# Patient Record
Sex: FEMALE | Race: WHITE | NOT HISPANIC OR LATINO | Employment: FULL TIME | URBAN - METROPOLITAN AREA
[De-identification: names, ages, dates, MRNs, and addresses within clinical notes are randomized per-mention and may not be internally consistent; named-entity substitution may affect disease eponyms.]

---

## 2022-03-09 ENCOUNTER — HOSPITAL ENCOUNTER (OUTPATIENT)
Facility: HOSPITAL | Age: 27
Setting detail: OBSERVATION
Discharge: HOME | End: 2022-03-10
Attending: EMERGENCY MEDICINE | Admitting: EMERGENCY MEDICINE

## 2022-03-09 ENCOUNTER — APPOINTMENT (OUTPATIENT)
Dept: GENERAL RADIOLOGY | Facility: HOSPITAL | Age: 27
End: 2022-03-09
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
## 2022-03-09 DIAGNOSIS — M65.841 STENOSING TENOSYNOVITIS OF FINGER OF RIGHT HAND: Primary | ICD-10-CM

## 2022-03-09 PROBLEM — S61.451A CAT BITE OF HAND, RIGHT, INITIAL ENCOUNTER: Status: ACTIVE | Noted: 2022-03-09

## 2022-03-09 LAB
ALBUMIN SERPL-MCNC: 4.7 G/DL (ref 3.5–5.2)
ALBUMIN/GLOB SERPL: 1.5 G/DL
ALP SERPL-CCNC: 61 U/L (ref 39–117)
ALT SERPL W P-5'-P-CCNC: 26 U/L (ref 1–33)
ANION GAP SERPL CALCULATED.3IONS-SCNC: 10.1 MMOL/L (ref 5–15)
AST SERPL-CCNC: 20 U/L (ref 1–32)
BASOPHILS # BLD AUTO: 0.03 10*3/MM3 (ref 0–0.2)
BASOPHILS NFR BLD AUTO: 0.3 % (ref 0–1.5)
BILIRUB SERPL-MCNC: 0.8 MG/DL (ref 0–1.2)
BUN SERPL-MCNC: 13 MG/DL (ref 6–20)
BUN/CREAT SERPL: 21.3 (ref 7–25)
CALCIUM SPEC-SCNC: 9.8 MG/DL (ref 8.6–10.5)
CHLORIDE SERPL-SCNC: 104 MMOL/L (ref 98–107)
CO2 SERPL-SCNC: 21.9 MMOL/L (ref 22–29)
CREAT SERPL-MCNC: 0.61 MG/DL (ref 0.57–1)
D-LACTATE SERPL-SCNC: 0.6 MMOL/L (ref 0.5–2)
DEPRECATED RDW RBC AUTO: 40 FL (ref 37–54)
EGFRCR SERPLBLD CKD-EPI 2021: 127.4 ML/MIN/1.73
EOSINOPHIL # BLD AUTO: 0.04 10*3/MM3 (ref 0–0.4)
EOSINOPHIL NFR BLD AUTO: 0.4 % (ref 0.3–6.2)
ERYTHROCYTE [DISTWIDTH] IN BLOOD BY AUTOMATED COUNT: 13 % (ref 12.3–15.4)
GLOBULIN UR ELPH-MCNC: 3.2 GM/DL
GLUCOSE SERPL-MCNC: 98 MG/DL (ref 65–99)
HCG SERPL QL: NEGATIVE
HCT VFR BLD AUTO: 41.1 % (ref 34–46.6)
HGB BLD-MCNC: 13.7 G/DL (ref 12–15.9)
IMM GRANULOCYTES # BLD AUTO: 0.02 10*3/MM3 (ref 0–0.05)
IMM GRANULOCYTES NFR BLD AUTO: 0.2 % (ref 0–0.5)
LYMPHOCYTES # BLD AUTO: 1.28 10*3/MM3 (ref 0.7–3.1)
LYMPHOCYTES NFR BLD AUTO: 13.3 % (ref 19.6–45.3)
MCH RBC QN AUTO: 28.2 PG (ref 26.6–33)
MCHC RBC AUTO-ENTMCNC: 33.3 G/DL (ref 31.5–35.7)
MCV RBC AUTO: 84.6 FL (ref 79–97)
MONOCYTES # BLD AUTO: 0.94 10*3/MM3 (ref 0.1–0.9)
MONOCYTES NFR BLD AUTO: 9.8 % (ref 5–12)
NEUTROPHILS NFR BLD AUTO: 7.29 10*3/MM3 (ref 1.7–7)
NEUTROPHILS NFR BLD AUTO: 76 % (ref 42.7–76)
NRBC BLD AUTO-RTO: 0 /100 WBC (ref 0–0.2)
PLATELET # BLD AUTO: 227 10*3/MM3 (ref 140–450)
PMV BLD AUTO: 9.2 FL (ref 6–12)
POTASSIUM SERPL-SCNC: 3.8 MMOL/L (ref 3.5–5.2)
PROT SERPL-MCNC: 7.9 G/DL (ref 6–8.5)
RBC # BLD AUTO: 4.86 10*6/MM3 (ref 3.77–5.28)
SARS-COV-2 RNA PNL SPEC NAA+PROBE: NOT DETECTED
SODIUM SERPL-SCNC: 136 MMOL/L (ref 136–145)
WBC NRBC COR # BLD: 9.6 10*3/MM3 (ref 3.4–10.8)

## 2022-03-09 PROCEDURE — 87040 BLOOD CULTURE FOR BACTERIA: CPT | Performed by: EMERGENCY MEDICINE

## 2022-03-09 PROCEDURE — 85025 COMPLETE CBC W/AUTO DIFF WBC: CPT | Performed by: PHYSICIAN ASSISTANT

## 2022-03-09 PROCEDURE — 25010000002 PIPERACILLIN SOD-TAZOBACTAM PER 1 G: Performed by: STUDENT IN AN ORGANIZED HEALTH CARE EDUCATION/TRAINING PROGRAM

## 2022-03-09 PROCEDURE — 87635 SARS-COV-2 COVID-19 AMP PRB: CPT | Performed by: PHYSICIAN ASSISTANT

## 2022-03-09 PROCEDURE — G0378 HOSPITAL OBSERVATION PER HR: HCPCS

## 2022-03-09 PROCEDURE — 80053 COMPREHEN METABOLIC PANEL: CPT | Performed by: PHYSICIAN ASSISTANT

## 2022-03-09 PROCEDURE — 84703 CHORIONIC GONADOTROPIN ASSAY: CPT | Performed by: PHYSICIAN ASSISTANT

## 2022-03-09 PROCEDURE — 99284 EMERGENCY DEPT VISIT MOD MDM: CPT

## 2022-03-09 PROCEDURE — 63710000001 ONDANSETRON ODT 4 MG TABLET DISPERSIBLE: Performed by: EMERGENCY MEDICINE

## 2022-03-09 PROCEDURE — 25010000002 PIPERACILLIN SOD-TAZOBACTAM PER 1 G: Performed by: PHYSICIAN ASSISTANT

## 2022-03-09 PROCEDURE — 73140 X-RAY EXAM OF FINGER(S): CPT

## 2022-03-09 PROCEDURE — 25010000002 ONDANSETRON PER 1 MG: Performed by: NURSE PRACTITIONER

## 2022-03-09 PROCEDURE — 87040 BLOOD CULTURE FOR BACTERIA: CPT | Performed by: PHYSICIAN ASSISTANT

## 2022-03-09 PROCEDURE — C9803 HOPD COVID-19 SPEC COLLECT: HCPCS

## 2022-03-09 PROCEDURE — 96365 THER/PROPH/DIAG IV INF INIT: CPT

## 2022-03-09 PROCEDURE — 83605 ASSAY OF LACTIC ACID: CPT | Performed by: STUDENT IN AN ORGANIZED HEALTH CARE EDUCATION/TRAINING PROGRAM

## 2022-03-09 PROCEDURE — 96375 TX/PRO/DX INJ NEW DRUG ADDON: CPT

## 2022-03-09 RX ORDER — ONDANSETRON 4 MG/1
4 TABLET, ORALLY DISINTEGRATING ORAL ONCE
Status: COMPLETED | OUTPATIENT
Start: 2022-03-09 | End: 2022-03-09

## 2022-03-09 RX ORDER — HYDROCODONE BITARTRATE AND ACETAMINOPHEN 7.5; 325 MG/1; MG/1
1 TABLET ORAL ONCE
Status: COMPLETED | OUTPATIENT
Start: 2022-03-09 | End: 2022-03-09

## 2022-03-09 RX ORDER — SODIUM CHLORIDE 0.9 % (FLUSH) 0.9 %
10 SYRINGE (ML) INJECTION AS NEEDED
Status: DISCONTINUED | OUTPATIENT
Start: 2022-03-09 | End: 2022-03-10 | Stop reason: HOSPADM

## 2022-03-09 RX ORDER — CHOLECALCIFEROL (VITAMIN D3) 125 MCG
5 CAPSULE ORAL NIGHTLY PRN
Status: DISCONTINUED | OUTPATIENT
Start: 2022-03-09 | End: 2022-03-10 | Stop reason: HOSPADM

## 2022-03-09 RX ORDER — SODIUM CHLORIDE 0.9 % (FLUSH) 0.9 %
10 SYRINGE (ML) INJECTION EVERY 12 HOURS SCHEDULED
Status: DISCONTINUED | OUTPATIENT
Start: 2022-03-09 | End: 2022-03-10 | Stop reason: HOSPADM

## 2022-03-09 RX ORDER — HYDROCODONE BITARTRATE AND ACETAMINOPHEN 7.5; 325 MG/1; MG/1
1 TABLET ORAL EVERY 6 HOURS PRN
Status: DISCONTINUED | OUTPATIENT
Start: 2022-03-09 | End: 2022-03-10 | Stop reason: HOSPADM

## 2022-03-09 RX ORDER — ONDANSETRON 2 MG/ML
4 INJECTION INTRAMUSCULAR; INTRAVENOUS EVERY 6 HOURS PRN
Status: DISCONTINUED | OUTPATIENT
Start: 2022-03-09 | End: 2022-03-10 | Stop reason: HOSPADM

## 2022-03-09 RX ORDER — ACETAMINOPHEN 325 MG/1
650 TABLET ORAL EVERY 4 HOURS PRN
Status: DISCONTINUED | OUTPATIENT
Start: 2022-03-09 | End: 2022-03-10 | Stop reason: HOSPADM

## 2022-03-09 RX ADMIN — ONDANSETRON 4 MG: 4 TABLET, ORALLY DISINTEGRATING ORAL at 12:38

## 2022-03-09 RX ADMIN — TAZOBACTAM SODIUM AND PIPERACILLIN SODIUM 3.38 G: 375; 3 INJECTION, SOLUTION INTRAVENOUS at 19:28

## 2022-03-09 RX ADMIN — Medication 10 ML: at 20:10

## 2022-03-09 RX ADMIN — TAZOBACTAM SODIUM AND PIPERACILLIN SODIUM 3.38 G: 375; 3 INJECTION, SOLUTION INTRAVENOUS at 11:07

## 2022-03-09 RX ADMIN — HYDROCODONE BITARTRATE AND ACETAMINOPHEN 1 TABLET: 7.5; 325 TABLET ORAL at 17:26

## 2022-03-09 RX ADMIN — ONDANSETRON 4 MG: 2 INJECTION INTRAMUSCULAR; INTRAVENOUS at 20:08

## 2022-03-09 RX ADMIN — HYDROCODONE BITARTRATE AND ACETAMINOPHEN 1 TABLET: 7.5; 325 TABLET ORAL at 12:38

## 2022-03-09 NOTE — PLAN OF CARE
Goal Outcome Evaluation:  Plan of Care Reviewed With: patient   Pt to obs for infected cat bite to the rt index finger. Pt had first dose of zosyn in the ER IV. Pt awaiting to see hand. PT alert and orient times 4, NAD, RSR on the monitor. Up ad pasquale. Will continue to monitor.      Progress: no change

## 2022-03-09 NOTE — ED TRIAGE NOTES
Pt arrives from [facility]. 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 Bite is to her right pointer finger. Patient masked at arrival and triage staff wore all appropriate PPE during entire encounter with patient.

## 2022-03-09 NOTE — CASE MANAGEMENT/SOCIAL WORK
Discharge Planning Assessment  [facility]     Patient Name: Nadia Jerome  MRN: [id number]  Today's Date: 3/9/2022    Admit Date: 3/9/2022     Discharge Needs Assessment     Row Name 03/09/22 1235       Living Environment    People in Home alone    Current Living Arrangements apartment    Primary Care Provided by self    Provides Primary Care For no one    Family Caregiver if Needed parent(s)    Quality of Family Relationships helpful;involved;supportive    Able to Return to Prior Arrangements yes       Resource/Environmental Concerns    Resource/Environmental Concerns none    Transportation Concerns no car       Transition Planning    Patient/Family Anticipates Transition to home    Patient/Family Anticipated Services at Transition none    Transportation Anticipated car, drives self       Discharge Needs Assessment    Readmission Within the Last 30 Days no previous admission in last 30 days    Equipment Currently Used at Home none    Concerns to be Addressed no discharge needs identified;denies needs/concerns at this time    Anticipated Changes Related to Illness none    Equipment Needed After Discharge none               Discharge Plan     Row Name 03/09/22 1237       Plan    Plan Patient plans to return home upon discharge, where she lives alone in an apartment with no environmental concerns. She is IND with IADL's at baseline and is  employed at a veterinary office. She drives and reports no transportation issues. She can drive herself home from the hospital. No DC needs identified. Encouraged patient to contact CCP should her DC needs change.    Patient/Family in Agreement with Plan yes    Plan Comments Home upon discharge. No DC needs. Can arrange own transportation.              Continued Care and Services - Admitted Since 3/9/2022    Coordination has not been started for this encounter.          Demographic Summary     Row Name 03/09/22 1231       General Information    Admission Type observation     Arrived From urgent care    Required Notices Provided Observation Status Notice    Expected Length of Stay (LOS) Less than 24 hours. Admitted to ED Observation Unit.    Referral Source admission list;emergency department    Reason for Consult discharge planning    Preferred Language English    General Information Comments Facesheet verified. Role of CCP explained. Appropriate PPE worn at all times.       Contact Information    Permission Granted to Share Info With family/designee    Contact Information Obtained for other (see comments)    Contact Information Comments Verified emergency contact information for family.               Functional Status     Row Name 03/09/22 1232       Functional Status    Usual Activity Tolerance excellent    Current Activity Tolerance excellent       Functional Status, IADL    Medications independent    Meal Preparation independent    Housekeeping independent    Laundry independent    Shopping independent    IADL Comments IND with IADL's at baseline.       Mental Status    General Appearance WDL WDL       Mental Status Summary    Recent Changes in Mental Status/Cognitive Functioning no changes       Employment/    Employment Status employed full-time    Shift Worked first shift    Current or Previous Occupation other (see comments)  Veterinary services.               Psychosocial     Row Name 03/09/22 1233       Values/Beliefs    Spiritual, Cultural Beliefs, Zoroastrianism Practices, Values that Affect Care no       Behavior WDL    Behavior WDL WDL       Emotion Mood WDL    Emotion/Mood/Affect WDL WDL       Speech WDL    Speech WDL WDL       Perceptual State WDL    Perceptual State WDL WDL       Thought Process WDL    Thought Process WDL WDL       Intellectual Performance WDL    Intellectual Performance WDL WDL       Coping/Stress    Major Change/Loss/Stressor none    Patient Personal Strengths able to adapt;expressive of emotions;expressive of needs;strong support system    Sources  of Support parent(s)    Techniques to Shiner with Loss/Stress/Change not applicable    Reaction to Health Status accepting;adjusting    Understanding of Condition and Treatment partial understanding of treatment;partial understanding of medical condition;needs additional information       Developmental Stage (Eriksson's)    Developmental Stage Stage 6 (18-35 years/Young Adulthood) Intimacy vs. Isolation       C-SSRS (Recent)    Q1 Wished to be Dead (Past Month) no    Q2 Suicidal Thoughts (Past Month) no    Q6 Suicide Behavior (Lifetime) no       Violence Risk    Feels Like Hurting Others no    Previous Attempt to Harm Others no               Abuse/Neglect     Row Name 03/09/22 1234       Personal Safety    Feels Unsafe at Home or Work/School no    Feels Threatened by Someone no    Does Anyone Try to Keep You From Having Contact with Others or Doing Things Outside Your Home? no    Physical Signs of Abuse Present no               Legal     Row Name 03/09/22 1234       Financial/Legal    Source of Income salary/wages               Substance Abuse    No documentation.                Patient Forms    No documentation.                   Eric Santiago RN

## 2022-03-09 NOTE — H&P
[facility]   HISTORY AND PHYSICAL    Patient Name: Nadia Jerome  : 1995  MRN: [id number]  Primary Care Physician:  Provider, No Known  Date of admission: 3/9/2022    Subjective   Subjective     Chief Complaint:   Chief Complaint   Patient presents with   • Animal Bite              HPI:    Nadia Jerome is a 26 y.o. female with no past medical history who presents to the ER with right hand pain after a cat bite yesterday.  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  She states that she was seen at an urgent care this morning where she was given a tetanus booster and was directed to the ER.  Patient reports pain and numbness and tingling of the right second digit that extends down past her knuckle into the palm.  Patient is states that she is unable to bend the finger secondary to swelling and pain.  Patient denies any fevers or chills.  Denies nausea, vomiting, and abdominal pain.  Denies urinary difficulties.  Denies chest pain or shortness of breath.    ER evaluation significant for x-ray of the right finger revealing no soft tissue gas or radiopaque foreign body with diffuse soft tissue swelling.  Hand surgery was contacted by the ER provider and agreed with overnight admission for IV antibiotics and observation.  Blood cultures were drawn and are pending at this time.  Lab work is essentially unremarkable with a normal WBC of 9.6 and lactate 0.6.    Review of Systems   All systems were reviewed and negative except for: What is mentioned above in the HPI.    Personal History     No past medical history on file.    No past surgical history on file.    Family History: family history is not on file. Otherwise pertinent FHx was reviewed and not pertinent to current issue.    Social History:  reports that she has been smoking cigarettes. She has never used smokeless tobacco. She reports current alcohol use. Drug use questions deferred to the  physician.    Home Medications:  Cholecalciferol    Allergies:  No Known Allergies    Objective   Objective     Vitals:   Temp:  [97.9 °F (36.6 °C)-99.6 °F (37.6 °C)] 97.9 °F (36.6 °C)  Heart Rate:  [] 97  Resp:  [16-18] 18  BP: (117-126)/(79-80) 117/80  Physical Exam    Constitutional: 26-year-old female, well-nourished, in no acute distress on room air   Eyes: PERRLA, sclerae anicteric, no conjunctival injection   HENT: NCAT, mucous membranes moist   Neck: Supple, no thyromegaly, no lymphadenopathy, trachea midline   Respiratory: Clear to auscultation bilaterally, nonlabored respirations    Cardiovascular: RRR, no murmurs, rubs, or gallops, palpable pedal pulses bilaterally   Gastrointestinal: Positive bowel sounds, soft, nontender, nondistended   Musculoskeletal: Right pointer finger is noted with a puncture wound on the lateral aspect of the proximal phalange.  There is mild erythema and edema noted circumferentially of the pointer finger that extends distally to the palmar and dorsal aspects of the hand but not to the thenar eminence.  There is limited range of motion of the pointer finger.  No bilateral ankle edema, no clubbing or cyanosis to extremities   Psychiatric: Appropriate affect, cooperative   Neurologic: Oriented x 3, strength symmetric in all extremities, Cranial Nerves grossly intact to confrontation, speech clear   Skin: No rashes     Result Review    Result Review:  I have personally reviewed the results from the time of this admission to 3/9/2022 12:36 EST and agree with these findings:  []  Laboratory  []  Microbiology  []  Radiology  []  EKG/Telemetry   []  Cardiology/Vascular   []  Pathology  []  Old records  []  Other:  Most notable findings include:   XR Finger 2+ View Right    Result Date: 3/9/2022  XR FINGER 2+ VW RIGHT-clinical: Cat bite  FINDINGS: No soft tissue gas or radiopaque foreign body, diffuse soft tissue swelling. No acute osseous or articular abnormality seen. The  remainder is unremarkable.  This report was finalized on 3/9/2022 11:30 AM by Dr. Logan Maya M.D.        Assessment/Plan   Assessment / Plan     Brief Patient Summary:  Nadia Jerome is a 26 y.o. female who is being admitted to the observation unit for further evaluation of a cat bite of the right hand with plan for hand surgery consultation and IV antibiotics.    Active Hospital Problems:  Active Hospital Problems    Diagnosis    • Cat bite of hand, right, initial encounter      Plan:     Cat bite right hand, pointer finger  -X-ray shows diffuse soft tissue swelling with no gas or radiopaque foreign body.  -Patient received Tdap vaccination at an urgent care this morning.  -Continue IV Zosyn every 8  -Dr. Andrea Felix hand surgery has been consulted  -Pain management       DVT prophylaxis:  Mechanical DVT prophylaxis orders are present.    CODE STATUS:    Code Status (Patient has no pulse and is not breathing): CPR (Attempt to Resuscitate)  Medical Interventions (Patient has pulse or is breathing): Full Support    Admission Status:  I believe this patient meets observation status.    I wore an face mask, eye protection, and gloves during this patient encounter. Patient also wearing a surgical mask. Hand hygeine performed before and after seeing the patient.    Electronically signed by Sherly Benz PA-C, 03/09/22, 12:36 PM EST.

## 2022-03-09 NOTE — ED PROVIDER NOTES
EMERGENCY DEPARTMENT ENCOUNTER    Room Number:  115/1  Date of encounter:  3/9/2022  PCP: Provider, No Known  Historian: Patient      I used full protective equipment while examining this patient.  This includes face mask, gloves and protective eyewear.  I washed my hands before entering the room and immediately upon leaving the room      HPI:  Chief Complaint: Cat bite  A complete HPI/ROS/PMH/PSH/SH/FH are unobtainable due to: Nothing    Context: Nadia Jerome is a 26 y.o. female who presents to the ED c/o 2 day history of cat bite to right index finger.  Patient works in a 's office.  She states they were restraining a cat yesterday for blood draw and it bit her on her right index finger over the PIP joint.  She is right-handed.  She denies any fevers, chills.  She states the swelling and pain has become progressively worse.  She describes the pain as constant, sharp, and worse with movement.  She denies any numbness or tingling distal to the finger.  She was initially seen at immediate care center prior to arrival.  She was sent here for further evaluation.  They did give her a tetanus shot there.    Review of Medical Records  I reviewed patient's immediate care office visit from prior to arrival.  Patient was sent here for further evaluation.    PAST MEDICAL HISTORY  Active Ambulatory Problems     Diagnosis Date Noted   • No Active Ambulatory Problems     Resolved Ambulatory Problems     Diagnosis Date Noted   • No Resolved Ambulatory Problems     No Additional Past Medical History         PAST SURGICAL HISTORY  History reviewed. No pertinent surgical history.      FAMILY HISTORY  History reviewed. No pertinent family history.      SOCIAL HISTORY  Social History     Socioeconomic History   • Marital status: Single   Tobacco Use   • Smoking status: Current Every Day Smoker     Types: Cigarettes   • Smokeless tobacco: Never Used   Vaping Use   • Vaping Use: Never used   Substance and Sexual Activity    • Alcohol use: Yes     Comment: occ   • Drug use: Defer   • Sexual activity: Defer         ALLERGIES  Patient has no known allergies.        REVIEW OF SYSTEMS  All systems reviewed and negative except for those discussed in HPI.       PHYSICAL EXAM    I have reviewed the triage vital signs and nursing notes.    ED Triage Vitals [03/09/22 0959]   Temp Heart Rate Resp BP SpO2   97.9 °F (36.6 °C) 108 18 -- 99 %      Temp src Heart Rate Source Patient Position BP Location FiO2 (%)   Tympanic Monitor -- -- --       Physical Exam  GENERAL: Alert, oriented, not distressed  HENT: head atraumatic, no nuchal rigidity  EYES: no scleral icterus, EOMI  CV: regular rhythm, regular rate, no murmur  RESPIRATORY: normal effort, CTA  ABDOMEN: soft, nontender  MUSCULOSKELETAL: Multiple puncture wounds over the anterior surface of the PIP joint of the index finger.  Limited range of motion secondary to pain.  There is erythema and swelling extending proximally to the MCP and into the hand.  She is neurovascularly intact distally.  NEURO: alert, moves all extremities, follows commands  SKIN: warm, dry        LAB RESULTS  Recent Results (from the past 24 hour(s))   Comprehensive Metabolic Panel    Collection Time: 03/09/22 10:42 AM    Specimen: Blood   Result Value Ref Range    Glucose 98 65 - 99 mg/dL    BUN 13 6 - 20 mg/dL    Creatinine 0.61 0.57 - 1.00 mg/dL    Sodium 136 136 - 145 mmol/L    Potassium 3.8 3.5 - 5.2 mmol/L    Chloride 104 98 - 107 mmol/L    CO2 21.9 (L) 22.0 - 29.0 mmol/L    Calcium 9.8 8.6 - 10.5 mg/dL    Total Protein 7.9 6.0 - 8.5 g/dL    Albumin 4.70 3.50 - 5.20 g/dL    ALT (SGPT) 26 1 - 33 U/L    AST (SGOT) 20 1 - 32 U/L    Alkaline Phosphatase 61 39 - 117 U/L    Total Bilirubin 0.8 0.0 - 1.2 mg/dL    Globulin 3.2 gm/dL    A/G Ratio 1.5 g/dL    BUN/Creatinine Ratio 21.3 7.0 - 25.0    Anion Gap 10.1 5.0 - 15.0 mmol/L    eGFR 127.4 >60.0 mL/min/1.73   hCG, Serum, Qualitative    Collection Time: 03/09/22 10:42 AM     Specimen: Blood   Result Value Ref Range    HCG Qualitative Negative Negative   CBC Auto Differential    Collection Time: 03/09/22 10:42 AM    Specimen: Blood   Result Value Ref Range    WBC 9.60 3.40 - 10.80 10*3/mm3    RBC 4.86 3.77 - 5.28 10*6/mm3    Hemoglobin 13.7 12.0 - 15.9 g/dL    Hematocrit 41.1 34.0 - 46.6 %    MCV 84.6 79.0 - 97.0 fL    MCH 28.2 26.6 - 33.0 pg    MCHC 33.3 31.5 - 35.7 g/dL    RDW 13.0 12.3 - 15.4 %    RDW-SD 40.0 37.0 - 54.0 fl    MPV 9.2 6.0 - 12.0 fL    Platelets 227 140 - 450 10*3/mm3    Neutrophil % 76.0 42.7 - 76.0 %    Lymphocyte % 13.3 (L) 19.6 - 45.3 %    Monocyte % 9.8 5.0 - 12.0 %    Eosinophil % 0.4 0.3 - 6.2 %    Basophil % 0.3 0.0 - 1.5 %    Immature Grans % 0.2 0.0 - 0.5 %    Neutrophils, Absolute 7.29 (H) 1.70 - 7.00 10*3/mm3    Lymphocytes, Absolute 1.28 0.70 - 3.10 10*3/mm3    Monocytes, Absolute 0.94 (H) 0.10 - 0.90 10*3/mm3    Eosinophils, Absolute 0.04 0.00 - 0.40 10*3/mm3    Basophils, Absolute 0.03 0.00 - 0.20 10*3/mm3    Immature Grans, Absolute 0.02 0.00 - 0.05 10*3/mm3    nRBC 0.0 0.0 - 0.2 /100 WBC   COVID-19,[facility] IN-HOUSE CEPHEID/YIFAN NP SWAB IN TRANSPORT MEDIA 8-12 HR TAT - Swab, Nasopharynx    Collection Time: 03/09/22 12:25 PM    Specimen: Nasopharynx; Swab   Result Value Ref Range    COVID19 Not Detected Not Detected - Ref. Range   Lactic Acid, Plasma    Collection Time: 03/09/22 12:57 PM    Specimen: Blood   Result Value Ref Range    Lactate 0.6 0.5 - 2.0 mmol/L       Ordered the above labs and independently reviewed the results.        RADIOLOGY  XR Finger 2+ View Right    Result Date: 3/9/2022  XR FINGER 2+ VW RIGHT-clinical: Cat bite  FINDINGS: No soft tissue gas or radiopaque foreign body, diffuse soft tissue swelling. No acute osseous or articular abnormality seen. The remainder is unremarkable.  This report was finalized on 3/9/2022 11:30 AM by Dr. Logan Maya M.D.        I ordered the above noted radiological studies. Reviewed by me and  discussed with radiologist.  See dictation for official radiology interpretation.      MEDICATIONS GIVEN IN ER    Medications   sodium chloride 0.9 % flush 10 mL (has no administration in time range)   sodium chloride 0.9 % flush 10 mL (has no administration in time range)   sodium chloride 0.9 % flush 10 mL (has no administration in time range)   acetaminophen (TYLENOL) tablet 650 mg (has no administration in time range)   melatonin tablet 5 mg (has no administration in time range)   piperacillin-tazobactam (ZOSYN) 3.375 g in iso-osmotic dextrose 50 ml (premix) (has no administration in time range)   HYDROcodone-acetaminophen (NORCO) 7.5-325 MG per tablet 1 tablet (has no administration in time range)   piperacillin-tazobactam (ZOSYN) 3.375 g in iso-osmotic dextrose 50 ml (premix) (0 g Intravenous Stopped 3/9/22 1140)   HYDROcodone-acetaminophen (NORCO) 7.5-325 MG per tablet 1 tablet (1 tablet Oral Given 3/9/22 1238)   ondansetron ODT (ZOFRAN-ODT) disintegrating tablet 4 mg (4 mg Oral Given 3/9/22 1238)         PROGRESS, DATA ANALYSIS, CONSULTS, AND MEDICAL DECISION MAKING    All labs have been independently reviewed by me.  All radiology studies have been reviewed by me and discussed with radiologist dictating the report.   EKG's independently viewed and interpreted by me.  Discussion below represents my analysis of pertinent findings related to patient's condition, differential diagnosis, treatment plan and final disposition.    I have discussed case with Dr. Lee, emergency room physician.  He has performed his own bedside examination and agrees with treatment plan.    ED Course as of 03/09/22 1521   Wed Mar 09, 2022   1009 Patient presents 1 day after cat bite to right index finger.  Concerning for early tenosynovitis.  Tetanus shot up-to-date.  Neurovascular intact distally.  Plan for x-rays to evaluate for foreign body, IV antibiotics, reevaluation. [EE]   1118 WBC: 9.60 [EE]   1118 Hemoglobin: 13.7 [EE]    1118 HCG Qualitative: Negative [EE]   1118 Right index finger films interpreted myself show no acute foreign body or fracture. [EE]   1140 Plan to admit the patient for further IV antibiotics and hand consultation.  I will consult Dr. Felix [EE]   1212 I discussed case with Ana, physician assistant in the observation unit.  She agrees to admit. [EE]      ED Course User Index  [EE] Rj Banda PA       AS OF 15:21 EST VITALS:    BP - 130/81  HR - 94  TEMP - 97.9 °F (36.6 °C) (Tympanic)  O2 SATS - 99%        DIAGNOSIS  Final diagnoses:   Stenosing tenosynovitis of finger of right hand   Cat bite, initial encounter         DISPOSITION  Admitted      Dictated utilizing Dragon dictation     Rj Banda PA  03/09/22 9467

## 2022-03-09 NOTE — ED PROVIDER NOTES
MD ATTESTATION NOTE    The JULIO C and I have discussed this patient's history, physical exam, and treatment plan.  I have reviewed the documentation and personally had a face to face interaction with the patient. I affirm the documentation and agree with the treatment and plan.  The attached note describes my personal findings.    I provided a substantive portion of the care of this patient. I personally performed the physical exam, in its entirety.    Nadia Jerome is a 26 y.o. female who presents to the ED c/o being bitten by a cat yesterday.  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  She reports she was bitten by cat yesterday morning.  She went to [facility] urgent care today and was sent here for further evaluation.  She received a tetanus shot at [facility].  She has not had any treatment for her symptoms.  She reports pain and swelling on her right index finger.  She denies other injury.      On exam:  GENERAL: Awake, alert, no acute distress  SKIN: Warm, dry  HENT: Normocephalic, atraumatic  EYES: no scleral icterus  CV: regular rhythm, regular rate  RESPIRATORY: normal effort, lungs clear  ABDOMEN: soft, nontender, nondistended  MUSCULOSKELETAL: Right index finger with single open wound.  Hemostatic.  Moderate swelling and erythema to the right second digit.  NEURO: alert, moves all extremities, follows commands    Labs  Recent Results (from the past 24 hour(s))   Comprehensive Metabolic Panel    Collection Time: 03/09/22 10:42 AM    Specimen: Blood   Result Value Ref Range    Glucose 98 65 - 99 mg/dL    BUN 13 6 - 20 mg/dL    Creatinine 0.61 0.57 - 1.00 mg/dL    Sodium 136 136 - 145 mmol/L    Potassium 3.8 3.5 - 5.2 mmol/L    Chloride 104 98 - 107 mmol/L    CO2 21.9 (L) 22.0 - 29.0 mmol/L    Calcium 9.8 8.6 - 10.5 mg/dL    Total Protein 7.9 6.0 - 8.5 g/dL    Albumin 4.70 3.50 - 5.20 g/dL    ALT (SGPT) 26 1 - 33 U/L    AST (SGOT) 20 1 - 32 U/L    Alkaline Phosphatase 61 39 - 117 U/L    Total  Bilirubin 0.8 0.0 - 1.2 mg/dL    Globulin 3.2 gm/dL    A/G Ratio 1.5 g/dL    BUN/Creatinine Ratio 21.3 7.0 - 25.0    Anion Gap 10.1 5.0 - 15.0 mmol/L    eGFR 127.4 >60.0 mL/min/1.73   hCG, Serum, Qualitative    Collection Time: 03/09/22 10:42 AM    Specimen: Blood   Result Value Ref Range    HCG Qualitative Negative Negative   CBC Auto Differential    Collection Time: 03/09/22 10:42 AM    Specimen: Blood   Result Value Ref Range    WBC 9.60 3.40 - 10.80 10*3/mm3    RBC 4.86 3.77 - 5.28 10*6/mm3    Hemoglobin 13.7 12.0 - 15.9 g/dL    Hematocrit 41.1 34.0 - 46.6 %    MCV 84.6 79.0 - 97.0 fL    MCH 28.2 26.6 - 33.0 pg    MCHC 33.3 31.5 - 35.7 g/dL    RDW 13.0 12.3 - 15.4 %    RDW-SD 40.0 37.0 - 54.0 fl    MPV 9.2 6.0 - 12.0 fL    Platelets 227 140 - 450 10*3/mm3    Neutrophil % 76.0 42.7 - 76.0 %    Lymphocyte % 13.3 (L) 19.6 - 45.3 %    Monocyte % 9.8 5.0 - 12.0 %    Eosinophil % 0.4 0.3 - 6.2 %    Basophil % 0.3 0.0 - 1.5 %    Immature Grans % 0.2 0.0 - 0.5 %    Neutrophils, Absolute 7.29 (H) 1.70 - 7.00 10*3/mm3    Lymphocytes, Absolute 1.28 0.70 - 3.10 10*3/mm3    Monocytes, Absolute 0.94 (H) 0.10 - 0.90 10*3/mm3    Eosinophils, Absolute 0.04 0.00 - 0.40 10*3/mm3    Basophils, Absolute 0.03 0.00 - 0.20 10*3/mm3    Immature Grans, Absolute 0.02 0.00 - 0.05 10*3/mm3    nRBC 0.0 0.0 - 0.2 /100 WBC   COVID-19,[facility] IN-HOUSE CEPHEID/YIFAN NP SWAB IN TRANSPORT MEDIA 8-12 HR TAT - Swab, Nasopharynx    Collection Time: 03/09/22 12:25 PM    Specimen: Nasopharynx; Swab   Result Value Ref Range    COVID19 Not Detected Not Detected - Ref. Range   Lactic Acid, Plasma    Collection Time: 03/09/22 12:57 PM    Specimen: Blood   Result Value Ref Range    Lactate 0.6 0.5 - 2.0 mmol/L       Radiology  XR Finger 2+ View Right    Result Date: 3/9/2022  XR FINGER 2+ VW RIGHT-clinical: Cat bite  FINDINGS: No soft tissue gas or radiopaque foreign body, diffuse soft tissue swelling. No acute osseous or articular abnormality seen. The  remainder is unremarkable.  This report was finalized on 3/9/2022 11:30 AM by Dr. Logan Maya M.D.        Medical Decision Making:  ED Course as of 03/09/22 1510   Wed Mar 09, 2022   1009 Patient presents 1 day after cat bite to right index finger.  Concerning for early tenosynovitis.  Tetanus shot up-to-date.  Neurovascular intact distally.  Plan for x-rays to evaluate for foreign body, IV antibiotics, reevaluation. [EE]   1118 WBC: 9.60 [EE]   1118 Hemoglobin: 13.7 [EE]   1118 HCG Qualitative: Negative [EE]   1118 Right index finger films interpreted myself show no acute foreign body or fracture. [EE]   1140 Plan to admit the patient for further IV antibiotics and hand consultation.  I will consult Dr. Felix [EE]   1212 I discussed case with Ana, physician assistant in the observation unit.  She agrees to admit. [EE]      ED Course User Index  [EE] Rj Banda, PA       Plan x-ray to rule out foreign body.  Plan IV antibiotics.  Plan blood cultures and laboratory evaluation.    PPE: The patient wore a mask and I wore an N95 mask throughout the entire patient encounter.      The patient has a COVID HM Topic on their chart, and they are fully vaccinated.    Diagnosis  Final diagnoses:   Stenosing tenosynovitis of finger of right hand   Cat bite, initial encounter        Jose Juan Lee MD  03/09/22 1510

## 2022-03-10 VITALS
TEMPERATURE: 98.2 F | OXYGEN SATURATION: 99 % | WEIGHT: 110 LBS | DIASTOLIC BLOOD PRESSURE: 77 MMHG | SYSTOLIC BLOOD PRESSURE: 118 MMHG | BODY MASS INDEX: 19.49 KG/M2 | HEART RATE: 76 BPM | RESPIRATION RATE: 17 BRPM | HEIGHT: 63 IN

## 2022-03-10 LAB
ANION GAP SERPL CALCULATED.3IONS-SCNC: 8 MMOL/L (ref 5–15)
BUN SERPL-MCNC: 11 MG/DL (ref 6–20)
BUN/CREAT SERPL: 13.9 (ref 7–25)
CALCIUM SPEC-SCNC: 8.6 MG/DL (ref 8.6–10.5)
CHLORIDE SERPL-SCNC: 107 MMOL/L (ref 98–107)
CO2 SERPL-SCNC: 22 MMOL/L (ref 22–29)
CREAT SERPL-MCNC: 0.79 MG/DL (ref 0.57–1)
DEPRECATED RDW RBC AUTO: 38.7 FL (ref 37–54)
EGFRCR SERPLBLD CKD-EPI 2021: 106.6 ML/MIN/1.73
ERYTHROCYTE [DISTWIDTH] IN BLOOD BY AUTOMATED COUNT: 12.9 % (ref 12.3–15.4)
GLUCOSE SERPL-MCNC: 96 MG/DL (ref 65–99)
HCT VFR BLD AUTO: 34.7 % (ref 34–46.6)
HGB BLD-MCNC: 12 G/DL (ref 12–15.9)
MCH RBC QN AUTO: 28.7 PG (ref 26.6–33)
MCHC RBC AUTO-ENTMCNC: 34.6 G/DL (ref 31.5–35.7)
MCV RBC AUTO: 83 FL (ref 79–97)
PLATELET # BLD AUTO: 193 10*3/MM3 (ref 140–450)
PMV BLD AUTO: 9.4 FL (ref 6–12)
POTASSIUM SERPL-SCNC: 4.2 MMOL/L (ref 3.5–5.2)
RBC # BLD AUTO: 4.18 10*6/MM3 (ref 3.77–5.28)
SODIUM SERPL-SCNC: 137 MMOL/L (ref 136–145)
WBC NRBC COR # BLD: 7.26 10*3/MM3 (ref 3.4–10.8)

## 2022-03-10 PROCEDURE — 80048 BASIC METABOLIC PNL TOTAL CA: CPT | Performed by: STUDENT IN AN ORGANIZED HEALTH CARE EDUCATION/TRAINING PROGRAM

## 2022-03-10 PROCEDURE — G0378 HOSPITAL OBSERVATION PER HR: HCPCS

## 2022-03-10 PROCEDURE — 25010000002 PIPERACILLIN SOD-TAZOBACTAM PER 1 G: Performed by: STUDENT IN AN ORGANIZED HEALTH CARE EDUCATION/TRAINING PROGRAM

## 2022-03-10 PROCEDURE — 85027 COMPLETE CBC AUTOMATED: CPT | Performed by: STUDENT IN AN ORGANIZED HEALTH CARE EDUCATION/TRAINING PROGRAM

## 2022-03-10 PROCEDURE — 25010000002 ONDANSETRON PER 1 MG: Performed by: NURSE PRACTITIONER

## 2022-03-10 PROCEDURE — 96366 THER/PROPH/DIAG IV INF ADDON: CPT

## 2022-03-10 PROCEDURE — 96376 TX/PRO/DX INJ SAME DRUG ADON: CPT

## 2022-03-10 RX ORDER — AMOXICILLIN AND CLAVULANATE POTASSIUM 875; 125 MG/1; MG/1
1 TABLET, FILM COATED ORAL 2 TIMES DAILY
Qty: 20 TABLET | Refills: 0 | OUTPATIENT
Start: 2022-03-10 | End: 2022-07-21

## 2022-03-10 RX ADMIN — TAZOBACTAM SODIUM AND PIPERACILLIN SODIUM 3.38 G: 375; 3 INJECTION, SOLUTION INTRAVENOUS at 03:55

## 2022-03-10 RX ADMIN — ONDANSETRON 4 MG: 2 INJECTION INTRAMUSCULAR; INTRAVENOUS at 13:07

## 2022-03-10 RX ADMIN — HYDROCODONE BITARTRATE AND ACETAMINOPHEN 1 TABLET: 7.5; 325 TABLET ORAL at 04:33

## 2022-03-10 RX ADMIN — ONDANSETRON 4 MG: 2 INJECTION INTRAMUSCULAR; INTRAVENOUS at 04:33

## 2022-03-10 RX ADMIN — ACETAMINOPHEN 650 MG: 325 TABLET ORAL at 13:06

## 2022-03-10 RX ADMIN — TAZOBACTAM SODIUM AND PIPERACILLIN SODIUM 3.38 G: 375; 3 INJECTION, SOLUTION INTRAVENOUS at 11:08

## 2022-03-10 RX ADMIN — Medication 10 ML: at 07:27

## 2022-03-10 NOTE — PROGRESS NOTES
The JULIO C and I have discussed this patient's history, physical exam and treatment plan.  I provided a substantive portion of the care of this patient.  I have reviewed the documentation and personally had a face to face interaction with the patient and personally performed the physical exam, in its entirety.  I affirm the documentation and agree with the treatment and plan.  The following describes my personal findings.      The patient admitted to observation unit for IV antibiotics and further evaluation of right hand infection secondary to cat bite.  Patient reports she was bitten yesterday while at work as a .  Patient reports that animal can be observed.  Patient denies fever, vomiting, reports her pain and swelling is much improved compared with yesterday.    Comprehensive Physical exam:  Patient is nontoxic appearing oriented, awake, alert  HEENT: normocephalic, atraumatic  Neck: No JVD no goiter, no pain with ROM  Pulmonary: Nontachypneic, no retractions  cardiovascular: Nontachycardic, cap refill intact all fingers right hand, right radial pulse intact  Abdomen: Nondistended,  musculoskeletal: Patient with right index finger 3 punctate lesions proximal to the PIP joint with surrounding erythema, swelling, no lymphangitis, mild tenderness to palpation over the second MCP joint.,  Distal sensation, cap refill intact, flexion intact extension of right index finger intact  Neuro/psychiatric:calm, appropriate, cooperative  Skin:warm, dry    Right hand infection post cat bite  IV antibiotics with improving symptoms  To be seen by hand surgeon, Dr. Andrea Felix today for further recommendations.    Patient was wearing facemask when I entered the room and throughout our encounter. Full protective equipment was worn throughout this patient encounter including a face mask, eye protection and gloves. Hand hygiene was performed before donning protective equipment and after removal when leaving the room.

## 2022-03-10 NOTE — PROGRESS NOTES
ED OBSERVATION PROGRESS/DISCHARGE SUMMARY    Date of Admission: 3/9/2022   LOS: 0 days   PCP: Provider, No Known    Final Diagnosis infected cat bite, cellulitis right index finger      Subjective   Patient is a pleasant afebrile ambulatory 26-year-old  female admitted to the observation unit for infected cat bite to right index finger.  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  She reports she took an amoxicillin she had in her medicine cabinet at home which did not improve her symptoms and subsequently came to the hospital.    She has been given IV Zosyn every 8 hours and reports marked improvement of her swelling and pain today.  She denies any fevers or chills overnight.  She unfortunately had a wait a little bit as hand surgery was stuck in the operating room today.  Dr. Silverman was thankfully able to come by this afternoon and deroof her wound and recommends discharge home on Augmentin, hydrogen peroxide dressing changes 2-3 times a day and to follow-up with MD in the office on Tuesday.      Hospital Outcome: Improving    ROS:  General: no fevers, chills  Respiratory: no cough, dyspnea  Cardiovascular: no chest pain, palpitations  Abdomen: No abdominal pain, nausea, vomiting, or diarrhea  Musculoskeletal: Improvement of swelling and redness to right index finger  Neurologic: No focal weakness    Objective   Physical Exam:  I have reviewed the vital signs.  Temp:  [97.9 °F (36.6 °C)-98.4 °F (36.9 °C)] 97.9 °F (36.6 °C)  Heart Rate:  [70-97] 77  Resp:  [14-18] 17  BP: (113-130)/(70-81) 118/71  General Appearance:    Alert, cooperative, no distress  Head:    Normocephalic, atraumatic  Eyes:    Sclerae anicteric  Neck:   Supple, no mass  Lungs: Clear to auscultation bilaterally, respirations unlabored  Heart: Regular rate and rhythm, S1 and S2 normal, no murmur, rub or gallop  Abdomen:  Soft, non-tender, bowel sounds active, nondistended  Extremities: No clubbing, cyanosis, or  edema to lower extremities.  There is a puncture wound to the radial aspect of patient's proximal phalanx with mild/moderate swelling and pustule present.  No drainage.  Mild confluent erythema surrounding puncture area.  Patient has almost complete flexion of the finger with only mild limitation secondary to swelling.  Cap refill is brisk.  Pulses:  2+ and symmetric in distal lower extremities  Skin: See extremity note, otherwise no rashes  Neurologic: Oriented x3, Normal strength to extremities    Results Review:    I have reviewed the labs, radiology results and diagnostic studies.    Results from last 7 days   Lab Units 03/10/22  0353   WBC 10*3/mm3 7.26   HEMOGLOBIN g/dL 12.0   HEMATOCRIT % 34.7   PLATELETS 10*3/mm3 193     Results from last 7 days   Lab Units 03/10/22  0353 03/09/22  1042   SODIUM mmol/L 137 136   POTASSIUM mmol/L 4.2 3.8   CHLORIDE mmol/L 107 104   CO2 mmol/L 22.0 21.9*   BUN mg/dL 11 13   CREATININE mg/dL 0.79 0.61   CALCIUM mg/dL 8.6 9.8   BILIRUBIN mg/dL  --  0.8   ALK PHOS U/L  --  61   ALT (SGPT) U/L  --  26   AST (SGOT) U/L  --  20   GLUCOSE mg/dL 96 98     Imaging Results (Last 24 Hours)     Procedure Component Value Units Date/Time    XR Finger 2+ View Right [[id number]] Collected: 03/09/22 1129     Updated: 03/09/22 1133    Narrative:      XR FINGER 2+ VW RIGHT-clinical: Cat bite     FINDINGS: No soft tissue gas or radiopaque foreign body, diffuse soft  tissue swelling. No acute osseous or articular abnormality seen. The  remainder is unremarkable.     This report was finalized on 3/9/2022 11:30 AM by Dr. Logan Maya M.D.             I have reviewed the medications.  ---------------------------------------------------------------------------------------------  Assessment/Plan   Assessment/Problem List    Cat bite of hand, right, initial encounter      Plan:  Cat bite right hand, pointer finger  -X-ray shows diffuse soft tissue swelling with no gas or radiopaque foreign  body.  -Patient received Tdap vaccination at an urgent care this morning.  -Continue IV Zosyn every 8, will switch to p.o. antibiotics  -Dr. Andrea Felix hand surgery has seen and evaluated patient and recommends discharge home from his standpoint with prescription for Augmentin, peroxide wound care, dressing changes 2-3 times a day and follow-up in the office on Tuesday.    Disposition: Home    Follow-up after Discharge: Dr. Felix in the office Tuesday the 15th    This note will serve as a discharge summary    Carolann Mcneil, CHRISTOS 03/10/22 10:36 EST          I have worn appropriate PPE during this patient encounter, sanitized my hands both with entering and exiting patient's room.

## 2022-03-10 NOTE — PLAN OF CARE
Goal Outcome Evaluation:    Patient alert and oriented. Patient received IV antibiotics today. Patient being discharged home with antibiotics.  seen patient and recommended oral antibiotics and said that patient should follow up with him next week. Discharge instructions given.

## 2022-03-10 NOTE — DISCHARGE INSTRUCTIONS
Clean the puncture site of your hand with peroxide and change the dressing 2-3 times a day, follow-up with Dr. Silverman in the office on Tuesday the 15th.

## 2022-03-10 NOTE — CONSULTS
.                                                                                                                                                     Patient Care Team:  Provider, No Known as PCP - General    Chief complaint   Right index finger pain, swelling and redness for 1 day after a cat bite injury.    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  She sustained a cat bite injury to her right index finger early in the morning of 3/9/2022.  This resulted in 2 puncture wounds on the base of right index finger at P1 level.     Shortly after that, she noticed there was increase of redness and swelling followed by pain. It also makes it difficult for her to move her fingers.  She was seen at emergency room at [facility] and has been admitted to observation unit and has been receiving IV antibiotics.     I am consulted to evaluate and treat her situation.  She denies any subjective fever or chills.  She said pain swelling appears to be better since admission.  She denies any numbness tingling.    Review of Systems  CONSTITUTIONAL: As per HPI.   HEENT: Eyes: No diplopia or blurred vision. ENT: No earache, sore throat or runny nose.   CARDIOVASCULAR: No pressure, squeezing, strangling, tightness, heaviness or aching about the chest, neck, axilla or epigastrium.   RESPIRATORY: No cough, shortness of breath, PND or orthopnea.   GASTROINTESTINAL: No nausea, vomiting or diarrhea.   GENITOURINARY: No dysuria, frequency or urgency.   MUSCULOSKELETAL: As per HPI.   SKIN: No change in skin, hair or nails.   NEUROLOGIC: No paresthesias, fasciculations, seizures or weakness.   PSYCHIATRIC: No disorder of thought or mood.   ENDOCRINE: No heat or cold intolerance, polyuria or polydipsia.   HEMATOLOGICAL: No easy bruising or bleeding.       History reviewed. No pertinent past medical history.  History reviewed. No pertinent surgical history.  History reviewed. No  pertinent family history.  Social History     Tobacco Use   • Smoking status: Current Every Day Smoker     Types: Cigarettes   • Smokeless tobacco: Never Used   Vaping Use   • Vaping Use: Never used   Substance Use Topics   • Alcohol use: Yes     Comment: occ   • Drug use: Defer     Medications Prior to Admission   Medication Sig Dispense Refill Last Dose   • VITAMIN D, CHOLECALCIFEROL, PO Take 5,000 mcg/day by mouth.        piperacillin-tazobactam, 3.375 g, Intravenous, Q8H  sodium chloride, 10 mL, Intravenous, Q12H      Allergies:   Patient has no known allergies.      Vital Signs   Temp:  [97.9 °F (36.6 °C)-98.4 °F (36.9 °C)] 98.2 °F (36.8 °C)  Heart Rate:  [70-77] 76  Resp:  [16-18] 17  BP: (113-121)/(70-80) 118/77      Physical Exam:     General Appearance:    Alert, cooperative, in no acute distress   Head:    Normocephalic, without obvious abnormality, atraumatic   Eyes:            Lids and lashes normal, conjunctivae and sclerae normal, no   icterus, no pallor, corneas clear, PERRLA   Ears:    Ears appear intact with no abnormalities noted   Throat:   No oral lesions, no thrush, oral mucosa moist   Neck:   No adenopathy, supple, trachea midline, no thyromegaly, no   carotid bruit, no JVD   Back:     No kyphosis present, no scoliosis present, no skin lesions,      erythema or scars, no tenderness to percussion or                   palpation,   range of motion normal   Lungs:     Clear to auscultation,respirations regular, even and                  unlabored    Heart:    Regular rhythm and normal rate, normal S1 and S2, no            murmur, no gallop, no rub, no click   Abdomen:     Normal bowel sounds, no masses, no organomegaly, soft        non-tender, non-distended, no guarding, no rebound                tenderness   Rectal:     Deferred   Extremities:   Moves all extremities well, no edema, no cyanosis, no             redness    RUE: viable with good blanching and capillary refills;  R index fingers: 2  puncture wounds on base of finger; wound on radial side has surrounding redness with purulent blister accumulation at center; local tenderness; wound on ulnar side is clean without any discharge; moderate limited finger flexion but no increase of pain on resisted motion; N/V is grossly intact.       Results Review:   I reviewed the patient's new clinical results.   Latest Reference Range & Units 03/10/22 03:53   WBC 3.40 - 10.80 10*3/mm3 7.26   RBC 3.77 - 5.28 10*6/mm3 4.18   Hemoglobin 12.0 - 15.9 g/dL 12.0   Hematocrit 34.0 - 46.6 % 34.7   R index finger X ray on 3/9/2022:  No soft tissue gas or radiopaque foreign body, diffuse soft  tissue swelling. No acute osseous or articular abnormality seen. The  remainder is unremarkable.           Cat bite of hand, right, initial encounter      Impression:  Right index finger cat bite injury with cellulitis and pustule formation.    Plans:  1. I unroofed pustule at bedside without any anesthesia.  I also washed out the wound with peroxide.  Patient tolerated well. I advised patient to continue wound soaking 2-3 times a day with diluted peroxide.  I also encouraged her to continue home exercise program with range of motion.    2. From hand surgery standpoint, it is fine for her to be discharged from hospital with oral Augmentin.  I gave her my card.  She will be seeing me in about a week in office for follow-up.    Andrea Felix MD  03/10/22  16:17 EST  Office phone: [phone]

## 2022-03-14 LAB
BACTERIA SPEC AEROBE CULT: NORMAL
BACTERIA SPEC AEROBE CULT: NORMAL

## 2022-11-03 ENCOUNTER — OFFICE VISIT (OUTPATIENT)
Dept: INTERNAL MEDICINE | Facility: CLINIC | Age: 27
End: 2022-11-03

## 2022-11-03 ENCOUNTER — PATIENT ROUNDING (BHMG ONLY) (OUTPATIENT)
Dept: INTERNAL MEDICINE | Facility: CLINIC | Age: 27
End: 2022-11-03

## 2022-11-03 VITALS
DIASTOLIC BLOOD PRESSURE: 60 MMHG | OXYGEN SATURATION: 99 % | HEART RATE: 78 BPM | HEIGHT: 62 IN | BODY MASS INDEX: 20.06 KG/M2 | SYSTOLIC BLOOD PRESSURE: 115 MMHG | TEMPERATURE: 98.2 F | WEIGHT: 109 LBS

## 2022-11-03 DIAGNOSIS — Z00.00 HEALTHCARE MAINTENANCE: Primary | ICD-10-CM

## 2022-11-03 DIAGNOSIS — R22.1 LOCALIZED SWELLING, MASS OR LUMP OF NECK: ICD-10-CM

## 2022-11-03 PROBLEM — S61.451A CAT BITE OF HAND, RIGHT, INITIAL ENCOUNTER: Status: RESOLVED | Noted: 2022-03-09 | Resolved: 2022-11-03
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
## 2022-11-03 PROCEDURE — 99395 PREV VISIT EST AGE 18-39: CPT | Performed by: NURSE PRACTITIONER

## 2022-11-03 RX ORDER — MULTIPLE VITAMINS W/ MINERALS TAB 9MG-400MCG
1 TAB ORAL DAILY
COMMUNITY

## 2022-11-03 NOTE — PROGRESS NOTES
November 3, 2022    PATIENT ROUNDING OBTAINED AT CHECK OUT.    PATIENT IS A [employer] EMPLOYEE WHICH IS HOW SHE HEARD OF OUR PROVIDER.  SHE STATES SHE HAD NO PROBLEMS MAKING HER NEW PATIENT APPOINTMENT.    SHE HAD NO ISSUES FROM THE TIME SHE CHECKED IN UNTIL SHE CHECKED OUT, AND STATED SHE COULD THINK OF NOTHING THAT WOULD HAVE IMPROVED HER VISIT TODAY AND SHE WAS VERY PLEASED WITH HER VISIT.    SHE WOULD RECOMMEND OUR OFFICE TO FAMILY/FRIENDS.

## 2022-11-03 NOTE — PROGRESS NOTES
"Subjective   Nadia Jerome is a 27 y.o. female.     Chief Complaint   Patient presents with   • Neck Pain     Patient is complaining of a lump on her that is solid, she states it feels like a bead patient throat has been feeling swollen or hurt while swallowing.    • Establish Care     Patient is a new patient looking to establish care.         History of Present Illness   She is here today as a new patient to establish care. She feels like her overall health is good. She eats a healthy, balanced diet. She exercises 2-3 days a week for an hour.   Previous PCP was her pediatrician.     Lump of neck- on the right side of the neck. She noticed this 3 months ago. \"It feels like a bead.\" The lump is tender. It has not changed in size or characteristics.     GYN- she is managed by GYN at [facility]. G0. Last pap completed last year, normal. No hx of abnormals. LMP 10/29/2022. Menses regular, light bleeding, lasting 5 days. She is not currently sexually active.     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    The following portions of the patient's history were reviewed and updated as appropriate: allergies, current medications, past family history, past medical history, past social history, past surgical history and problem list.    Review of Systems   Constitutional: Negative for appetite change, chills, diaphoresis, fatigue, fever, unexpected weight gain and unexpected weight loss.   HENT: Positive for postnasal drip and sore throat. Negative for congestion, dental problem, ear pain, hearing loss, mouth sores, nosebleeds, rhinorrhea, sinus pressure, swollen glands, tinnitus and trouble swallowing.         Right neck lump.   Eyes: Negative for blurred vision, pain, discharge, redness, itching and visual disturbance.   Respiratory: Negative for cough, chest tightness, shortness of breath and wheezing.    Cardiovascular: Negative for chest pain, palpitations and " leg swelling.   Gastrointestinal: Negative for abdominal distention, abdominal pain, blood in stool, constipation, diarrhea, nausea, vomiting and GERD.   Endocrine: Negative for cold intolerance and heat intolerance.   Genitourinary: Negative for breast discharge, breast lump, breast pain, difficulty urinating, dyspareunia, dysuria, flank pain, frequency, genital sores, hematuria, menstrual problem, pelvic pain, pelvic pressure, urgency, urinary incontinence, vaginal bleeding and vaginal discharge.   Musculoskeletal: Positive for back pain (chronic, intermittent low back). Negative for arthralgias, gait problem, joint swelling, myalgias and neck pain.   Skin: Negative for color change, rash and skin lesions.   Allergic/Immunologic: Negative for environmental allergies and food allergies.   Neurological: Negative for dizziness, tremors, seizures, syncope, speech difficulty, weakness, light-headedness, numbness, headache, memory problem and confusion.   Hematological: Negative for adenopathy. Does not bruise/bleed easily.   Psychiatric/Behavioral: Negative for sleep disturbance, suicidal ideas, depressed mood and stress. The patient is not nervous/anxious.        Objective   Physical Exam  Constitutional:       Appearance: Normal appearance. She is well-developed.   HENT:      Head: Normocephalic and atraumatic.      Right Ear: Hearing, tympanic membrane, ear canal and external ear normal.      Left Ear: Hearing, tympanic membrane, ear canal and external ear normal.      Nose: Nose normal.      Right Sinus: No maxillary sinus tenderness or frontal sinus tenderness.      Left Sinus: No maxillary sinus tenderness or frontal sinus tenderness.      Mouth/Throat:      Lips: Pink.      Mouth: Mucous membranes are moist.      Dentition: Normal dentition.      Tongue: No lesions.      Pharynx: Oropharynx is clear. Uvula midline.      Tonsils: No tonsillar exudate.   Eyes:      General: Lids are normal.      Extraocular  Movements: Extraocular movements intact.      Conjunctiva/sclera: Conjunctivae normal.      Pupils: Pupils are equal, round, and reactive to light.   Neck:      Thyroid: No thyroid mass, thyromegaly or thyroid tenderness.      Vascular: No carotid bruit.      Trachea: Trachea normal.        Comments: Time the, round, firm, nonmobile, tender lump at right side of neck.  Cardiovascular:      Rate and Rhythm: Normal rate and regular rhythm.      Pulses: Normal pulses.           Radial pulses are 2+ on the right side and 2+ on the left side.        Popliteal pulses are 2+ on the right side and 2+ on the left side.        Dorsalis pedis pulses are 2+ on the right side and 2+ on the left side.        Posterior tibial pulses are 2+ on the right side and 2+ on the left side.      Heart sounds: S1 normal and S2 normal.   Pulmonary:      Effort: Pulmonary effort is normal.      Breath sounds: Normal breath sounds.   Abdominal:      General: Bowel sounds are normal. There is no distension or abdominal bruit.      Palpations: Abdomen is soft. There is no hepatomegaly or splenomegaly.      Tenderness: There is no abdominal tenderness.      Hernia: No hernia is present.   Musculoskeletal:      Cervical back: Normal range of motion and neck supple.      Right lower leg: No edema.      Left lower leg: No edema.   Lymphadenopathy:      Head:      Right side of head: No submental, submandibular, tonsillar or occipital adenopathy.      Left side of head: No submental, submandibular, tonsillar or occipital adenopathy.      Cervical: No cervical adenopathy.      Upper Body:      Right upper body: No supraclavicular adenopathy.      Left upper body: No supraclavicular adenopathy.      Lower Body: No right inguinal adenopathy. No left inguinal adenopathy.   Skin:     General: Skin is warm and dry.      Findings: No rash.      Nails: There is no clubbing.   Neurological:      Mental Status: She is alert and oriented to person, place, and  time.      Cranial Nerves: Cranial nerves are intact.      Motor: Motor function is intact.      Coordination: Coordination is intact.      Gait: Gait is intact.      Deep Tendon Reflexes:      Reflex Scores:       Patellar reflexes are 2+ on the right side and 2+ on the left side.  Psychiatric:         Attention and Perception: Attention normal.         Mood and Affect: Mood and affect normal.         Speech: Speech normal.         Behavior: Behavior normal.         Thought Content: Thought content normal.         Cognition and Memory: Cognition normal.         Vitals:    11/03/22 0832   BP: 115/60   Pulse: 78   Temp: 98.2 °F (36.8 °C)   SpO2: 99%      Body mass index is 19.93 kg/m².    Assessment & Plan   Diagnoses and all orders for this visit:    1. Healthcare maintenance (Primary)  -     Comprehensive Metabolic Panel  -     Lipid Panel With LDL / HDL Ratio  -     TSH  -     T4, Free  -     Hepatitis C Antibody    2. Localized swelling, mass or lump of neck  -     US Head Neck Soft Tissue      1.  Preventative counseling-encouraged continuing healthy, balanced diet and regular exercise.  Ensure adequate dietary intake of calcium and vitamin D.  2.  Lump of neck-?  Lymph node versus cyst.  Check ultrasound of the neck for further evaluation.  Encouraged her to continue to monitor for any changes.  “Discussed risks/benefits to vaccination, reviewed components of the vaccine, discussed VIS, discussed informed consent, informed consent obtained. Patient/Parent was allowed to accept or refuse vaccine. Questions answered to satisfactory state of patient/Parent. We reviewed typical age appropriate and seasonally appropriate vaccinations. Reviewed immunization history and updated state vaccination form as needed. Patient was counseled on HPV    Dentist up-to-date  Eye exam due  GYN up-to-date  Wear sunscreen outside    Fasting lab work today, will call with results.  Follow-up in 1 year for CPE or sooner as needed.

## 2022-11-04 LAB
ALBUMIN SERPL-MCNC: 4.9 G/DL (ref 3.5–5.2)
ALBUMIN/GLOB SERPL: 2.2 G/DL
ALP SERPL-CCNC: 46 U/L (ref 39–117)
ALT SERPL-CCNC: 14 U/L (ref 1–33)
AST SERPL-CCNC: 16 U/L (ref 1–32)
BILIRUB SERPL-MCNC: 0.7 MG/DL (ref 0–1.2)
BUN SERPL-MCNC: 13 MG/DL (ref 6–20)
BUN/CREAT SERPL: 16 (ref 7–25)
CALCIUM SERPL-MCNC: 10.2 MG/DL (ref 8.6–10.5)
CHLORIDE SERPL-SCNC: 105 MMOL/L (ref 98–107)
CHOLEST SERPL-MCNC: 157 MG/DL (ref 0–200)
CO2 SERPL-SCNC: 27.4 MMOL/L (ref 22–29)
CREAT SERPL-MCNC: 0.81 MG/DL (ref 0.57–1)
EGFRCR SERPLBLD CKD-EPI 2021: 102.8 ML/MIN/1.73
GLOBULIN SER CALC-MCNC: 2.2 GM/DL
GLUCOSE SERPL-MCNC: 96 MG/DL (ref 65–99)
HCV AB S/CO SERPL IA: 0.1 S/CO RATIO (ref 0–0.9)
HDLC SERPL-MCNC: 56 MG/DL (ref 40–60)
LDLC SERPL CALC-MCNC: 92 MG/DL (ref 0–100)
LDLC/HDLC SERPL: 1.65 {RATIO}
POTASSIUM SERPL-SCNC: 4.4 MMOL/L (ref 3.5–5.2)
PROT SERPL-MCNC: 7.1 G/DL (ref 6–8.5)
SODIUM SERPL-SCNC: 140 MMOL/L (ref 136–145)
T4 FREE SERPL-MCNC: 1.24 NG/DL (ref 0.93–1.7)
TRIGL SERPL-MCNC: 43 MG/DL (ref 0–150)
TSH SERPL DL<=0.005 MIU/L-ACNC: 3.07 UIU/ML (ref 0.27–4.2)
VLDLC SERPL CALC-MCNC: 9 MG/DL (ref 5–40)

## 2022-11-15 ENCOUNTER — HOSPITAL ENCOUNTER (OUTPATIENT)
Dept: ULTRASOUND IMAGING | Facility: HOSPITAL | Age: 27
Discharge: HOME | End: 2022-11-15
Admitting: NURSE PRACTITIONER

## 2022-11-15 PROCEDURE — 76536 US EXAM OF HEAD AND NECK: CPT

## 2022-11-18 DIAGNOSIS — R59.0 CERVICAL ADENOPATHY: Primary | ICD-10-CM

## 2022-11-18 DIAGNOSIS — R22.1 LOCALIZED SWELLING, MASS OR LUMP OF NECK: ICD-10-CM

## 2022-12-27 ENCOUNTER — HOSPITAL ENCOUNTER (OUTPATIENT)
Dept: CT IMAGING | Facility: HOSPITAL | Age: 27
Discharge: HOME | End: 2022-12-27
Admitting: NURSE PRACTITIONER

## 2022-12-27 DIAGNOSIS — R22.1 LOCALIZED SWELLING, MASS OR LUMP OF NECK: ICD-10-CM

## 2022-12-27 DIAGNOSIS — R59.0 CERVICAL ADENOPATHY: ICD-10-CM

## 2022-12-27 PROCEDURE — 70491 CT SOFT TISSUE NECK W/DYE: CPT

## 2022-12-27 PROCEDURE — 25010000002 IOPAMIDOL 61 % SOLUTION: Performed by: NURSE PRACTITIONER

## 2022-12-27 RX ADMIN — IOPAMIDOL 85 ML: 612 INJECTION, SOLUTION INTRAVENOUS at 15:17

## 2023-07-19 PROBLEM — F33.1 MODERATE EPISODE OF RECURRENT MAJOR DEPRESSIVE DISORDER: Status: ACTIVE | Noted: 2023-07-19

## 2023-08-09 ENCOUNTER — TELEMEDICINE (OUTPATIENT)
Dept: INTERNAL MEDICINE | Facility: CLINIC | Age: 28
End: 2023-08-09
Payer: MEDICAID

## 2023-08-09 DIAGNOSIS — F33.1 MODERATE EPISODE OF RECURRENT MAJOR DEPRESSIVE DISORDER: Primary | ICD-10-CM

## 2023-08-09 PROCEDURE — 1160F RVW MEDS BY RX/DR IN RCRD: CPT | Performed by: NURSE PRACTITIONER

## 2023-08-09 PROCEDURE — 1159F MED LIST DOCD IN RCRD: CPT | Performed by: NURSE PRACTITIONER

## 2023-08-09 PROCEDURE — 99213 OFFICE O/P EST LOW 20 MIN: CPT | Performed by: NURSE PRACTITIONER

## 2023-08-09 RX ORDER — BUPROPION HYDROCHLORIDE 300 MG/1
300 TABLET ORAL EVERY MORNING
Qty: 90 TABLET | Refills: 1 | Status: SHIPPED | OUTPATIENT
Start: 2023-08-09

## 2023-08-09 NOTE — PROGRESS NOTES
Subjective   Nadia Jerome is a 28 y.o. female.     Chief Complaint   Patient presents with    Anxiety    Depression        History of Present Illness   You have chosen to receive care through a telehealth visit.  Do you consent to use a video/audio connection for your medical care today? Yes    She is here today for telehealth visit using [employer] from her home.  Provider is working from her office.  She is following up on depression.  At last office visit started Wellbutrin  mg every morning.  She notes significant improvement in depression since starting the Wellbutrin.  She notes improvement in energy level.  She is unsure if it has helped with focus. She had two exams today and had some difficulty on her exams.  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  She notes that she will have to re-read questions frequently. She notes some constipation since starting the Wellbutrin.  She is sleeping well at night.  She denies any anxiety, debbie or SI.    The following portions of the patient's history were reviewed and updated as appropriate: allergies, current medications, past family history, past medical history, past social history, past surgical history, and problem list.    Review of Systems   Constitutional:  Negative for chills, fatigue and fever.   Respiratory:  Negative for cough, chest tightness, shortness of breath and wheezing.    Cardiovascular:  Negative for chest pain, palpitations and leg swelling.   Gastrointestinal:  Positive for constipation.   Psychiatric/Behavioral:  Positive for decreased concentration, depressed mood (improving) and stress. Negative for agitation, sleep disturbance, suicidal ideas and negative for hyperactivity. The patient is not nervous/anxious.      Objective   Physical Exam  Constitutional:       General: She is awake.      Appearance: Normal appearance.   Pulmonary:      Effort: Pulmonary effort is normal.   Neurological:       Mental Status: She is alert and oriented to person, place, and time. Mental status is at baseline.   Psychiatric:         Attention and Perception: Attention and perception normal.         Mood and Affect: Mood and affect normal.         Speech: Speech normal.         Behavior: Behavior normal. Behavior is cooperative.         Thought Content: Thought content normal.         Cognition and Memory: Cognition and memory normal.         Judgment: Judgment normal.       There were no vitals filed for this visit.       Assessment & Plan   Problems Addressed this Visit          Mental Health    Moderate episode of recurrent major depressive disorder - Primary    Relevant Medications    buPROPion XL (Wellbutrin XL) 300 MG 24 hr tablet     Diagnoses         Codes Comments    Moderate episode of recurrent major depressive disorder    -  Primary ICD-10-CM: F33.1  ICD-9-CM: 296.32           1.  MDD-improving with treatment.  Will try increasing Wellbutrin XL to 300 mg daily to see if this better helps with depression and concentration.  Discussed appropriate use and adverse effects.  Recommend hydrating well with fluids, taking a fiber supplement and using MiraLAX or stool softener as needed for constipation.  She will notify me in 3 to 4 weeks after increasing the Wellbutrin dose.  New prescription sent to pharmacy.    [employer] visit lasting 10 minutes.

## 2024-02-04 DIAGNOSIS — F33.1 MODERATE EPISODE OF RECURRENT MAJOR DEPRESSIVE DISORDER: ICD-10-CM

## 2024-02-05 RX ORDER — BUPROPION HYDROCHLORIDE 300 MG/1
300 TABLET ORAL EVERY MORNING
Qty: 30 TABLET | Refills: 0 | Status: SHIPPED | OUTPATIENT
Start: 2024-02-05

## 2024-03-05 DIAGNOSIS — F33.1 MODERATE EPISODE OF RECURRENT MAJOR DEPRESSIVE DISORDER: ICD-10-CM

## 2024-03-05 RX ORDER — BUPROPION HYDROCHLORIDE 300 MG/1
300 TABLET ORAL EVERY MORNING
Qty: 30 TABLET | Refills: 0 | OUTPATIENT
Start: 2024-03-05

## 2024-03-11 DIAGNOSIS — F33.1 MODERATE EPISODE OF RECURRENT MAJOR DEPRESSIVE DISORDER: ICD-10-CM

## 2024-03-11 RX ORDER — BUPROPION HYDROCHLORIDE 300 MG/1
300 TABLET ORAL EVERY MORNING
Qty: 30 TABLET | Refills: 0 | Status: SHIPPED | OUTPATIENT
Start: 2024-03-11

## 2024-04-01 ENCOUNTER — OFFICE VISIT (OUTPATIENT)
Dept: INTERNAL MEDICINE | Facility: CLINIC | Age: 29
End: 2024-04-01
Payer: COMMERCIAL

## 2024-04-01 VITALS
WEIGHT: 111.2 LBS | SYSTOLIC BLOOD PRESSURE: 116 MMHG | OXYGEN SATURATION: 99 % | TEMPERATURE: 98.4 F | DIASTOLIC BLOOD PRESSURE: 80 MMHG | HEART RATE: 81 BPM | BODY MASS INDEX: 20.46 KG/M2 | HEIGHT: 62 IN

## 2024-04-01 DIAGNOSIS — R51.9 NEW ONSET OF HEADACHES: Primary | ICD-10-CM

## 2024-04-01 DIAGNOSIS — H53.9 VISION CHANGES: ICD-10-CM

## 2024-04-01 PROCEDURE — 99214 OFFICE O/P EST MOD 30 MIN: CPT | Performed by: NURSE PRACTITIONER

## 2024-04-01 RX ORDER — DIPHENOXYLATE HYDROCHLORIDE AND ATROPINE SULFATE 2.5; .025 MG/1; MG/1
TABLET ORAL DAILY
COMMUNITY

## 2024-04-01 RX ORDER — ONDANSETRON 4 MG/1
4 TABLET, ORALLY DISINTEGRATING ORAL EVERY 8 HOURS PRN
Qty: 12 TABLET | Refills: 0 | Status: SHIPPED | OUTPATIENT
Start: 2024-04-01

## 2024-04-01 RX ORDER — RIZATRIPTAN BENZOATE 10 MG/1
10 TABLET, ORALLY DISINTEGRATING ORAL ONCE AS NEEDED
Qty: 9 TABLET | Refills: 0 | Status: SHIPPED | OUTPATIENT
Start: 2024-04-01

## 2024-04-01 NOTE — PROGRESS NOTES
Subjective   Nadia Jerome is a 28 y.o. female.     Chief Complaint   Patient presents with    Headache     Pt c/o HA mainly in am with nausea and some vision changes X 3 WEEKS,        History of Present Illness   She is here today to discuss new onset headaches. Symptoms started 3 weeks ago. Pain occurs along the left side of the forehead and radiates toward the top of the left side of the head. Pain will occur in the morning when she wakes up, lasting 1 to 3 hours. Sometimes the headaches will come on in the middle of the day. She notes some photophobia with this. She notes the left eye vision is more affected with the headaches. She notes nausea with the headaches. She has had two episodes of vomiting with the headaches. Headache pain is described as sharp, shooting. She notes headaches affect her ability to work when the pain is severe.  She has been using tylenol and Excedrin with some improvement.   She notes 10 headaches over the past 3 weeks.   She denies any family history of migraine headaches.  She denies any dizziness, lightheadedness, unilateral weakness, aura, confusion, gait disturbance or difficulty with balance.  She denies any new medications or supplements.  She denies any allergy symptoms.  She is hydrating well with 60 to 90 ounces of water a day and drinks 1 cup of coffee a day.  She was last seen by the eye doctor 6 months ago. She started wearing contacts 6 months ago.     The following portions of the patient's history were reviewed and updated as appropriate: allergies, current medications, past family history, past medical history, past social history, past surgical history, and problem list.    Review of Systems   Constitutional: Negative.    Eyes:  Positive for photophobia.   Respiratory: Negative.     Cardiovascular: Negative.    Gastrointestinal:  Positive for nausea and vomiting.   Neurological:  Positive for headache. Negative for dizziness, tremors, seizures, syncope, facial  asymmetry, speech difficulty, weakness, light-headedness, numbness, memory problem and confusion.   Psychiatric/Behavioral: Negative.         Objective   Physical Exam  Constitutional:       Appearance: She is well-developed.   Neck:      Thyroid: No thyroid mass, thyromegaly or thyroid tenderness.      Vascular: No carotid bruit.      Trachea: Trachea normal.   Cardiovascular:      Rate and Rhythm: Normal rate and regular rhythm.      Chest Wall: PMI is not displaced.      Pulses:           Radial pulses are 2+ on the right side and 2+ on the left side.        Dorsalis pedis pulses are 2+ on the right side and 2+ on the left side.        Posterior tibial pulses are 2+ on the right side and 2+ on the left side.      Heart sounds: S1 normal and S2 normal.   Pulmonary:      Effort: Pulmonary effort is normal.      Breath sounds: Normal breath sounds.   Musculoskeletal:      Right lower leg: No edema.      Left lower leg: No edema.   Lymphadenopathy:      Head:      Right side of head: No submental, submandibular, tonsillar or occipital adenopathy.      Left side of head: No submental, submandibular, tonsillar or occipital adenopathy.      Cervical: No cervical adenopathy.   Skin:     General: Skin is warm and dry.      Capillary Refill: Capillary refill takes less than 2 seconds.      Nails: There is no clubbing.   Neurological:      General: No focal deficit present.      Mental Status: She is alert and oriented to person, place, and time.      Cranial Nerves: Cranial nerves 2-12 are intact.      Sensory: Sensation is intact.      Motor: Motor function is intact.      Coordination: Coordination is intact.      Gait: Gait is intact.      Deep Tendon Reflexes:      Reflex Scores:       Patellar reflexes are 2+ on the right side and 2+ on the left side.  Psychiatric:         Attention and Perception: Attention normal.         Mood and Affect: Mood and affect normal.         Speech: Speech normal.         Behavior:  Behavior normal.         Thought Content: Thought content normal.         Cognition and Memory: Cognition normal.         Vitals:    04/01/24 1457   BP: 116/80   Pulse: 81   Temp: 98.4 °F (36.9 °C)   SpO2: 99%      Body mass index is 20.34 kg/m².    Assessment & Plan   Problems Addressed this Visit    None  Visit Diagnoses       New onset of headaches    -  Primary    Relevant Medications    rizatriptan MLT (Maxalt-MLT) 10 MG disintegrating tablet    Riboflavin 100 MG tablet    ondansetron ODT (ZOFRAN-ODT) 4 MG disintegrating tablet    Other Relevant Orders    MRI Brain With & Without Contrast    Ambulatory Referral to Neurology    Vision changes        Relevant Orders    MRI Brain With & Without Contrast          Diagnoses         Codes Comments    New onset of headaches    -  Primary ICD-10-CM: R51.9  ICD-9-CM: 784.0     Vision changes     ICD-10-CM: H53.9  ICD-9-CM: 368.9           1.  New onset of headaches-these appear to be migraine headaches.  With her new onset of headaches and vision changes associated with the headaches recommend further evaluation with MRI brain.  Referral placed to neurology.  Prescription sent for rizatriptan 10 mg to use once as needed for headaches.  Discussed appropriate use and adverse effects.  Also recommend starting magnesium and riboflavin.  Continue to hydrate well with fluids and recommend keeping a headache diary to assist in identifying any triggers.  Will plan to follow-up in 1 month as scheduled.

## 2024-04-02 ENCOUNTER — TELEPHONE (OUTPATIENT)
Dept: INTERNAL MEDICINE | Facility: CLINIC | Age: 29
End: 2024-04-02
Payer: COMMERCIAL

## 2024-04-02 NOTE — TELEPHONE ENCOUNTER
Caller: Nadia Jerome    Relationship: Self    Best call back number: [phone]    What medication are you requesting: SEDATION MEDICATION    What are your current symptoms: PATIENT GETTING MRI    Have you had these symptoms before:    [x] Yes  [] No    Have you been treated for these symptoms before:   [] Yes  [x] No    If a prescription is needed, what is your preferred pharmacy and phone number:  [facility] #[id number] - [place], [place] - [address] AT [facility] [address] - [phone]  - [phone]     Additional notes:PATIENT ALMOST PASSED OUT DURING FIRST MRI. WAS ADVISED TO GET SOMETHING TO RELAX HER FOR THIS UPCOMING ONE.    MRI SCHEDULED APRIL 18

## 2024-04-03 DIAGNOSIS — F41.9 ANXIETY DUE TO INVASIVE PROCEDURE: Primary | ICD-10-CM

## 2024-04-03 RX ORDER — ALPRAZOLAM 0.5 MG/1
0.5 TABLET ORAL ONCE
Qty: 1 TABLET | Refills: 0 | Status: SHIPPED | OUTPATIENT
Start: 2024-04-03 | End: 2024-04-03

## 2024-04-10 DIAGNOSIS — F33.1 MODERATE EPISODE OF RECURRENT MAJOR DEPRESSIVE DISORDER: ICD-10-CM

## 2024-04-10 RX ORDER — BUPROPION HYDROCHLORIDE 300 MG/1
300 TABLET ORAL EVERY MORNING
Qty: 30 TABLET | Refills: 0 | Status: SHIPPED | OUTPATIENT
Start: 2024-04-10

## 2024-04-16 DIAGNOSIS — F33.1 MODERATE EPISODE OF RECURRENT MAJOR DEPRESSIVE DISORDER: ICD-10-CM

## 2024-04-17 RX ORDER — BUPROPION HYDROCHLORIDE 300 MG/1
300 TABLET ORAL EVERY MORNING
Qty: 30 TABLET | Refills: 0 | OUTPATIENT
Start: 2024-04-17

## 2024-04-17 RX ORDER — BUPROPION HYDROCHLORIDE 300 MG/1
300 TABLET ORAL EVERY MORNING
Qty: 30 TABLET | Refills: 0 | Status: SHIPPED | OUTPATIENT
Start: 2024-04-17

## 2024-04-18 ENCOUNTER — HOSPITAL ENCOUNTER (OUTPATIENT)
Dept: MRI IMAGING | Facility: HOSPITAL | Age: 29
Discharge: HOME | End: 2024-04-18
Admitting: NURSE PRACTITIONER
Payer: COMMERCIAL

## 2024-04-18 PROCEDURE — A9577 INJ MULTIHANCE: HCPCS | Performed by: NURSE PRACTITIONER

## 2024-04-18 PROCEDURE — 70553 MRI BRAIN STEM W/O & W/DYE: CPT

## 2024-04-18 PROCEDURE — 0 GADOBENATE DIMEGLUMINE 529 MG/ML SOLUTION: Performed by: NURSE PRACTITIONER

## 2024-04-18 RX ADMIN — GADOBENATE DIMEGLUMINE 10 ML: 529 INJECTION, SOLUTION INTRAVENOUS at 11:03

## 2024-04-22 ENCOUNTER — TELEMEDICINE (OUTPATIENT)
Dept: INTERNAL MEDICINE | Facility: CLINIC | Age: 29
End: 2024-04-22
Payer: COMMERCIAL

## 2024-04-22 DIAGNOSIS — F33.1 MODERATE EPISODE OF RECURRENT MAJOR DEPRESSIVE DISORDER: ICD-10-CM

## 2024-04-22 DIAGNOSIS — Z00.00 HEALTHCARE MAINTENANCE: ICD-10-CM

## 2024-04-22 DIAGNOSIS — Z86.2 HISTORY OF ANEMIA: ICD-10-CM

## 2024-04-22 DIAGNOSIS — R11.2 NAUSEA AND VOMITING, UNSPECIFIED VOMITING TYPE: ICD-10-CM

## 2024-04-22 DIAGNOSIS — G43.109 MIGRAINE WITH AURA AND WITHOUT STATUS MIGRAINOSUS, NOT INTRACTABLE: Primary | ICD-10-CM

## 2024-04-22 PROCEDURE — 99214 OFFICE O/P EST MOD 30 MIN: CPT | Performed by: NURSE PRACTITIONER

## 2024-04-22 RX ORDER — LEVONORGESTREL AND ETHINYL ESTRADIOL 0.15-0.03
KIT ORAL
COMMUNITY
Start: 2024-04-22

## 2024-04-22 RX ORDER — PROPRANOLOL HYDROCHLORIDE 20 MG/1
20 TABLET ORAL 2 TIMES DAILY
Qty: 60 TABLET | Refills: 5 | Status: SHIPPED | OUTPATIENT
Start: 2024-04-22

## 2024-04-22 NOTE — PROGRESS NOTES
Subjective   Nadia Jerome is a 28 y.o. female.     Chief Complaint   Patient presents with    Migraine     Pt c/o migraines HA, nausea, vomiting, lightheaded, SOB and hot flashes X yesterday. Vomiting stopped.    Nausea    Vomiting        History of Present Illness   You have chosen to receive care through a telehealth visit.  Do you consent to use a video/audio connection for your medical care today? Yes    She is here today for a telehealth visit using [employer] from her home.  Provider is working from her office.  She is following up to discuss worsening migraine headaches. She notes migraine headache which started yesterday morning.  She noted nausea, vomiting, lightheadedness, hot flashes and shortness of breath yesterday. She noted nausea and upset stomach starting yesterday morning. She then became very shaky with worsening nausea, hot flash and sensation like she was going to pass out. She then had 3 episodes of vomiting.    She tried the rizatriptan and Zofran with minimal improvement. She notes improvement in nausea and vomiting today. She still notes decreased appetite.  She denies any fever, chills, abdominal pain, diarrhea.  She has had 4 migraine headaches since last office visit. She has tried using the rizatriptan without improvement.  She was taking the riboflavin but stopped this secondary to concern for bright yellow-colored urine.  She has not been able to tolerate the magnesium secondary to difficulty swallowing the pill with this supplement.  At last office she was prescribed Zofran and rizatriptan and MRI brain completed for further evaluation of new onset migraine headaches with aura. MRI brain was negative for any acute abnormality.  There is note of mild septal deviation.  She is scheduled to see neurology on May 3.    The following portions of the patient's history were reviewed and updated as appropriate: allergies, current medications, past family history, past medical history, past  social history, past surgical history, and problem list.    Review of Systems   Constitutional:  Positive for appetite change. Negative for chills, fatigue and fever.   Respiratory: Negative.     Cardiovascular: Negative.    Gastrointestinal:  Positive for nausea and vomiting. Negative for abdominal pain and diarrhea.   Neurological:  Positive for headache.       Objective   Physical Exam  Constitutional:       General: She is awake.      Appearance: Normal appearance.   Pulmonary:      Effort: Pulmonary effort is normal.   Neurological:      Mental Status: She is alert and oriented to person, place, and time.   Psychiatric:         Attention and Perception: Attention and perception normal.         Mood and Affect: Mood and affect normal.         Speech: Speech normal.         Behavior: Behavior normal. Behavior is cooperative.         Thought Content: Thought content normal.         Cognition and Memory: Cognition and memory normal.         Judgment: Judgment normal.         There were no vitals filed for this visit.       Assessment & Plan   Problems Addressed this Visit       Moderate episode of recurrent major depressive disorder    Relevant Orders    CBC & Differential    Comprehensive Metabolic Panel    TSH Rfx On Abnormal To Free T4    Migraine with aura and without status migrainosus, not intractable - Primary    Relevant Medications    Rimegepant Sulfate (NURTEC) 75 MG tablet dispersible tablet    propranolol (INDERAL) 20 MG tablet    Riboflavin 100 MG tablet    Other Relevant Orders    CBC & Differential    Vitamin B12    Folate     Other Visit Diagnoses       Nausea and vomiting, unspecified vomiting type        Relevant Orders    CBC & Differential    Comprehensive Metabolic Panel    Healthcare maintenance        Relevant Orders    CBC & Differential    Comprehensive Metabolic Panel    Lipid Panel With LDL / HDL Ratio    TSH Rfx On Abnormal To Free T4    Vitamin D,25-Hydroxy    Vitamin B12    Folate     Ferritin    Iron Profile    History of anemia        Relevant Orders    CBC & Differential    Vitamin B12    Folate    Ferritin    Iron Profile          Diagnoses         Codes Comments    Migraine with aura and without status migrainosus, not intractable    -  Primary ICD-10-CM: G43.109  ICD-9-CM: 346.00     Nausea and vomiting, unspecified vomiting type     ICD-10-CM: R11.2  ICD-9-CM: 787.01     Healthcare maintenance     ICD-10-CM: Z00.00  ICD-9-CM: V70.0     History of anemia     ICD-10-CM: Z86.2  ICD-9-CM: V12.3     Moderate episode of recurrent major depressive disorder     ICD-10-CM: F33.1  ICD-9-CM: 296.32           1.  Migraine with aura-she has tried and failed Maxalt for abortive therapy.  Will transition to Nurtec 75 mg once daily as needed for acute migraine headache.  She is having 4 more migraine headaches a month.  Will start a preventative medication.  Start propranolol 20 mg twice daily.  Discussed with her to monitor blood pressure and heart rate while taking this medication and notify if heart rate is persistently less than 60.  Recommend restarting riboflavin and trying a magnesium powder supplement to see if this is better tolerated.  Encouraged her to hydrate well with fluids and avoid triggers.  Follow-up with neurology as scheduled.  2.  Nausea and vomiting-resolved.  Discussed with her today this is likely a viral GI infection.  Okay to continue using Zofran every 8 hours as needed for any nausea.  Hydrate well with fluids and recommend bland diet.  Notify for return of symptoms.    MyChart visit lasting 20 minutes.

## 2024-04-29 DIAGNOSIS — G43.109 MIGRAINE WITH AURA AND WITHOUT STATUS MIGRAINOSUS, NOT INTRACTABLE: ICD-10-CM

## 2024-05-03 ENCOUNTER — OFFICE VISIT (OUTPATIENT)
Dept: NEUROLOGY | Facility: CLINIC | Age: 29
End: 2024-05-03
Payer: COMMERCIAL

## 2024-05-03 ENCOUNTER — PATIENT ROUNDING (BHMG ONLY) (OUTPATIENT)
Dept: NEUROLOGY | Facility: CLINIC | Age: 29
End: 2024-05-03
Payer: COMMERCIAL

## 2024-05-03 VITALS
WEIGHT: 111 LBS | BODY MASS INDEX: 20.43 KG/M2 | HEART RATE: 80 BPM | DIASTOLIC BLOOD PRESSURE: 66 MMHG | SYSTOLIC BLOOD PRESSURE: 120 MMHG | HEIGHT: 62 IN | OXYGEN SATURATION: 98 %

## 2024-05-03 DIAGNOSIS — G43.109 MIGRAINE WITH AURA AND WITHOUT STATUS MIGRAINOSUS, NOT INTRACTABLE: Primary | ICD-10-CM

## 2024-05-03 RX ORDER — FREMANEZUMAB-VFRM 225 MG/1.5ML
225 INJECTION SUBCUTANEOUS
Qty: 1.5 ML | Refills: 5 | Status: SHIPPED | OUTPATIENT
Start: 2024-05-03 | End: 2024-10-30

## 2024-05-03 NOTE — LETTER
May 3, 2024       No Recipients    Patient: Nadia Jerome   YOB: 1995   Date of Visit: 5/3/2024     Dear CHRISTOS Pena:       Thank you for referring Nadia Jerome to me for evaluation. Below are the relevant portions of my assessment and plan of care.    If you have questions, please do not hesitate to call me. I look forward to following Nadia along with you.         Sincerely,        CHRISTOS Hull        CC:   No Recipients    Elizabeth Nunes APRN  [phone]  Sign when Signing Visit  [facility] NEUROLOGY         Date of Visit: 5/3/2024    Name: Nadia Jerome    :  1995    PCP: Rosa Muñiz APRN    Visit Type: an initial evaluation         Subjective    Patient ID: Nadia is a 28 y.o. female.         History of Present Illness  I had the pleasure of seeing your patient for the first time today.  As you may know she is a 28-year-old female here today for initial evaluation for migraine headache.  She is referred by her primary care physician.    History:    Patient has history of depression/anxiety.    Patient states that she began experiencing new onset headache approximately 2 months ago.  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  She states that she began experiencing symptoms of headache which has not been a symptom for her in the past.  She denies any family history of migraine.  She did initially present to her primary care nurse practitioner who started her on rizatriptan as needed for abortive treatment for migraine and gave her magnesium and riboflavin to take for preventative therapy.  Patient states that the magnesium and riboflavin were difficult for her to remember to take daily she also did not notice any particular improvement in headache symptoms.  She states that the rizatriptan does dull headache however does not completely abort headache symptoms.  She has tried 2  doses.  She did follow back up with primary care after some time his headaches were not improving.  She was prescribed propranolol for preventative treatment however patient does have a low normal blood pressure.  They also gave her a prescription for Nurtec however she has not been able to get this filled.    Patient did also end up having MRI of the brain.  MRI of the brain showed no significant abnormalities.    Patient denies any excessive caffeine use.  She denies previous history of head injury.  She reports fairly good sleep sleeping approximately 7 to 8 hours.  She does notice that bright lights and not drinking enough water tend to trigger her headache symptoms.    Current:    Patient currently reports 15 days of headache in the last 30 days 7 of which have been migrainous in nature.  Headaches start on either the right or left side described as a pressure or stabbing-like pain typically located behind her eye.  She rates pain as a 6 or 7 out of 10 at its worst associated with slight light and sound sensitivity as well as nausea and occasionally vomiting.  She has also had 2 headaches where she has had some blurriness of vision or had trouble focusing when trying to read during the headache.  She states that lying down will improve headache symptoms.  She states that the majority of the headaches are dull achy headache.  She does take Tylenol or Excedrin Migraine over-the-counter which does decrease severity of headache symptoms.  She denies any other new neurological complaints at today's visit.  See headache questionnaire dated 5/3/20/2024 for additional information.      The following portions of the patient's history were reviewed and updated as appropriate: allergies, current medications, past family history, past medical history, past social history, past surgical history, and problem list.                 Review of Systems   Constitutional:  Negative for activity change, appetite change, fatigue and  "unexpected weight change.   HENT:  Negative for hearing loss, tinnitus and trouble swallowing.         Phonophobia   Eyes:  Positive for photophobia and visual disturbance. Negative for pain.   Respiratory:  Negative for chest tightness and shortness of breath.    Cardiovascular:  Negative for palpitations.   Gastrointestinal:  Positive for nausea and vomiting.   Musculoskeletal:  Negative for neck pain.   Neurological:  Positive for headaches. Negative for dizziness, syncope, facial asymmetry, speech difficulty, weakness, light-headedness and numbness.   Psychiatric/Behavioral:  Negative for confusion and sleep disturbance.             Current Medications:    Current Outpatient Medications   Medication Instructions   • Ajovy 225 mg, Subcutaneous, Every 30 Days   • buPROPion XL (WELLBUTRIN XL) 300 mg, Oral, Every Morning   • Kurvelo 0.15-30 MG-MCG per tablet    • ondansetron ODT (ZOFRAN-ODT) 4 mg, Translingual, Every 8 Hours PRN   • Rimegepant Sulfate (NURTEC) 75 mg, Oral, Daily PRN          /66   Pulse 80   Ht 157.5 cm (62\")   Wt 50.3 kg (111 lb)   SpO2 98%   BMI 20.30 kg/m²                Objective    Neurological Exam  Mental Status  Awake, alert and oriented to person, place and time. Speech is normal. Language is fluent with no aphasia.    Cranial Nerves  CN II: Visual fields full to confrontation.  CN III, IV, VI: Extraocular movements intact bilaterally. Normal lids and orbits bilaterally. Pupils equal round and reactive to light bilaterally.  CN V: Facial sensation is normal.  CN VII: Full and symmetric facial movement.  CN IX, X: Palate elevates symmetrically  CN XI: Shoulder shrug strength is normal.  CN XII: Tongue midline without atrophy or fasciculations.    Motor  Normal muscle bulk throughout. Normal muscle tone. No abnormal involuntary movements. Strength is 5/5 throughout all four extremities.    Sensory  Sensation is intact to light touch, pinprick, vibration and proprioception in all " four extremities.    Reflexes  Deep tendon reflexes are 2+ and symmetric in all four extremities.    Coordination    Finger-to-nose, rapid alternating movements and heel-to-shin normal bilaterally without dysmetria.    Gait  Normal casual, toe, heel and tandem gait.      Physical Exam  Eyes:      General: Lids are normal.      Extraocular Movements: Extraocular movements intact.      Pupils: Pupils are equal, round, and reactive to light.   Neurological:      Motor: Motor strength is normal.     Coordination: Coordination is intact.      Deep Tendon Reflexes: Reflexes are normal and symmetric.   Psychiatric:         Speech: Speech normal.                     Assessment & Plan    Diagnoses and all orders for this visit:    1. Migraine with aura and without status migrainosus, not intractable (Primary)  -     Fremanezumab-vfrm (Ajovy) 225 MG/1.5ML solution auto-injector; Inject 225 mg under the skin into the appropriate area as directed Every 30 (Thirty) Days for 180 days.  Dispense: 1.5 mL; Refill: 5  -     Rimegepant Sulfate (NURTEC) 75 MG tablet dispersible tablet; Take 1 tablet by mouth Daily As Needed (migraine headaches) for up to 90 days.  Dispense: 8 tablet; Refill: 2       At this time I would like to go ahead and start patient on Ajovy for headache prophylaxis as she cannot tolerate propranolol due to low blood pressure, she is already on antidepressant medication, and she has tried riboflavin and magnesium for preventative therapy.    In addition we will have her try Nurtec for abortive treatment due to significant nausea and vomiting she has with headaches as well as this is a nondrowsy medication she may be able to take when she is at work.  We did give her samples to try today.    We will plan for follow-up in 3 months or sooner if needed.  I did encourage patient to track headaches using an tanner called my migraine Buddy.  We also discussed use of bluelight lenses for her screen usage which may help with  some of the light sensitivity.            Elizabeth SHER    Neurology    [facility]    Phone: [phone]    5/3/2024 , 11:20 EDT

## 2024-05-03 NOTE — PROGRESS NOTES
[facility] NEUROLOGY         Date of Visit: 5/3/2024    Name: Nadia Jerome    :  1995    PCP: Rosa Muñiz APRN    Visit Type: an initial evaluation         Subjective     Patient ID: Nadia is a 28 y.o. female.         History of Present Illness  I had the pleasure of seeing your patient for the first time today.  As you may know she is a 28-year-old female here today for initial evaluation for migraine headache.  She is referred by her primary care physician.    History:    Patient has history of depression/anxiety.    Patient states that she began experiencing new onset headache approximately 2 months ago.  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  She states that she began experiencing symptoms of headache which has not been a symptom for her in the past.  She denies any family history of migraine.  She did initially present to her primary care nurse practitioner who started her on rizatriptan as needed for abortive treatment for migraine and gave her magnesium and riboflavin to take for preventative therapy.  Patient states that the magnesium and riboflavin were difficult for her to remember to take daily she also did not notice any particular improvement in headache symptoms.  She states that the rizatriptan does dull headache however does not completely abort headache symptoms.  She has tried 2 doses.  She did follow back up with primary care after some time his headaches were not improving.  She was prescribed propranolol for preventative treatment however patient does have a low normal blood pressure.  They also gave her a prescription for Nurtec however she has not been able to get this filled.    Patient did also end up having MRI of the brain.  MRI of the brain showed no significant abnormalities.    Patient denies any excessive caffeine use.  She denies previous history of head injury.  She reports fairly good  sleep sleeping approximately 7 to 8 hours.  She does notice that bright lights and not drinking enough water tend to trigger her headache symptoms.    Current:    Patient currently reports 15 days of headache in the last 30 days 7 of which have been migrainous in nature.  Headaches start on either the right or left side described as a pressure or stabbing-like pain typically located behind her eye.  She rates pain as a 6 or 7 out of 10 at its worst associated with slight light and sound sensitivity as well as nausea and occasionally vomiting.  She has also had 2 headaches where she has had some blurriness of vision or had trouble focusing when trying to read during the headache.  She states that lying down will improve headache symptoms.  She states that the majority of the headaches are dull achy headache.  She does take Tylenol or Excedrin Migraine over-the-counter which does decrease severity of headache symptoms.  She denies any other new neurological complaints at today's visit.  See headache questionnaire dated 5/3/20/2024 for additional information.      The following portions of the patient's history were reviewed and updated as appropriate: allergies, current medications, past family history, past medical history, past social history, past surgical history, and problem list.                 Review of Systems   Constitutional:  Negative for activity change, appetite change, fatigue and unexpected weight change.   HENT:  Negative for hearing loss, tinnitus and trouble swallowing.         Phonophobia   Eyes:  Positive for photophobia and visual disturbance. Negative for pain.   Respiratory:  Negative for chest tightness and shortness of breath.    Cardiovascular:  Negative for palpitations.   Gastrointestinal:  Positive for nausea and vomiting.   Musculoskeletal:  Negative for neck pain.   Neurological:  Positive for headaches. Negative for dizziness, syncope, facial asymmetry, speech difficulty, weakness,  "light-headedness and numbness.   Psychiatric/Behavioral:  Negative for confusion and sleep disturbance.             Current Medications:    Current Outpatient Medications   Medication Instructions    Ajovy 225 mg, Subcutaneous, Every 30 Days    buPROPion XL (WELLBUTRIN XL) 300 mg, Oral, Every Morning    Kurvelo 0.15-30 MG-MCG per tablet     ondansetron ODT (ZOFRAN-ODT) 4 mg, Translingual, Every 8 Hours PRN    Rimegepant Sulfate (NURTEC) 75 mg, Oral, Daily PRN          /66   Pulse 80   Ht 157.5 cm (62\")   Wt 50.3 kg (111 lb)   SpO2 98%   BMI 20.30 kg/m²                Objective     Neurological Exam  Mental Status  Awake, alert and oriented to person, place and time. Speech is normal. Language is fluent with no aphasia.    Cranial Nerves  CN II: Visual fields full to confrontation.  CN III, IV, VI: Extraocular movements intact bilaterally. Normal lids and orbits bilaterally. Pupils equal round and reactive to light bilaterally.  CN V: Facial sensation is normal.  CN VII: Full and symmetric facial movement.  CN IX, X: Palate elevates symmetrically  CN XI: Shoulder shrug strength is normal.  CN XII: Tongue midline without atrophy or fasciculations.    Motor  Normal muscle bulk throughout. Normal muscle tone. No abnormal involuntary movements. Strength is 5/5 throughout all four extremities.    Sensory  Sensation is intact to light touch, pinprick, vibration and proprioception in all four extremities.    Reflexes  Deep tendon reflexes are 2+ and symmetric in all four extremities.    Coordination    Finger-to-nose, rapid alternating movements and heel-to-shin normal bilaterally without dysmetria.    Gait  Normal casual, toe, heel and tandem gait.      Physical Exam  Eyes:      General: Lids are normal.      Extraocular Movements: Extraocular movements intact.      Pupils: Pupils are equal, round, and reactive to light.   Neurological:      Motor: Motor strength is normal.     Coordination: Coordination is " intact.      Deep Tendon Reflexes: Reflexes are normal and symmetric.   Psychiatric:         Speech: Speech normal.                     Assessment & Plan     Diagnoses and all orders for this visit:    1. Migraine with aura and without status migrainosus, not intractable (Primary)  -     Fremanezumab-vfrm (Ajovy) 225 MG/1.5ML solution auto-injector; Inject 225 mg under the skin into the appropriate area as directed Every 30 (Thirty) Days for 180 days.  Dispense: 1.5 mL; Refill: 5  -     Rimegepant Sulfate (NURTEC) 75 MG tablet dispersible tablet; Take 1 tablet by mouth Daily As Needed (migraine headaches) for up to 90 days.  Dispense: 8 tablet; Refill: 2       At this time I would like to go ahead and start patient on Ajovy for headache prophylaxis as she cannot tolerate propranolol due to low blood pressure, she is already on antidepressant medication, and she has tried riboflavin and magnesium for preventative therapy.    In addition we will have her try Nurtec for abortive treatment due to significant nausea and vomiting she has with headaches as well as this is a nondrowsy medication she may be able to take when she is at work.  We did give her samples to try today.    We will plan for follow-up in 3 months or sooner if needed.  I did encourage patient to track headaches using an tanner called my migraine Buddy.  We also discussed use of bluelight lenses for her screen usage which may help with some of the light sensitivity.            Elizabeth SHER    Neurology    [facility] [place]    Phone: [phone]    5/3/2024 , 11:20 EDT

## 2024-05-10 DIAGNOSIS — F33.1 MODERATE EPISODE OF RECURRENT MAJOR DEPRESSIVE DISORDER: ICD-10-CM

## 2024-05-10 RX ORDER — BUPROPION HYDROCHLORIDE 300 MG/1
300 TABLET ORAL EVERY MORNING
Qty: 30 TABLET | Refills: 0 | OUTPATIENT
Start: 2024-05-10

## 2024-05-10 RX ORDER — LEVONORGESTREL AND ETHINYL ESTRADIOL 0.15-0.03
KIT ORAL
Qty: 28 TABLET | OUTPATIENT
Start: 2024-05-10

## 2024-05-14 DIAGNOSIS — F33.1 MODERATE EPISODE OF RECURRENT MAJOR DEPRESSIVE DISORDER: ICD-10-CM

## 2024-05-14 RX ORDER — LEVONORGESTREL AND ETHINYL ESTRADIOL 0.15-0.03
KIT ORAL
Qty: 28 TABLET | OUTPATIENT
Start: 2024-05-14

## 2024-05-14 RX ORDER — BUPROPION HYDROCHLORIDE 300 MG/1
300 TABLET ORAL EVERY MORNING
Qty: 30 TABLET | Refills: 0 | OUTPATIENT
Start: 2024-05-14

## 2024-05-14 NOTE — TELEPHONE ENCOUNTER
Phoned patient to schedule follow up for labs and medication patient stated she had viewed her labs on mychart and did not know why she had to come in made patient aware her last visit said to return in one month patient stated she would call back to schedule.

## 2024-05-15 RX ORDER — BUPROPION HYDROCHLORIDE 300 MG/1
300 TABLET ORAL EVERY MORNING
Qty: 90 TABLET | Refills: 0 | Status: SHIPPED | OUTPATIENT
Start: 2024-05-15

## 2024-05-17 ENCOUNTER — PRIOR AUTHORIZATION (OUTPATIENT)
Dept: NEUROLOGY | Facility: CLINIC | Age: 29
End: 2024-05-17
Payer: COMMERCIAL

## 2024-05-17 NOTE — TELEPHONE ENCOUNTER
She is just using nurtec abortively and the ajovy preventatively.     she cannot tolerate propranolol due to low blood pressure, she is already on antidepressant medication so cannot take amitriptyline, nortriptyline, or cymbalta

## 2024-05-17 NOTE — TELEPHONE ENCOUNTER
Recd denial on pts ajovy, as it doesn't meet medically necessary requirments    Not met:  Confirmation that the requested agent will not be used in josef with another CGRP and is approved for chronic migraine prevention     has had inadequate response to at least one migraine prevention     Please advise

## 2024-05-29 RX ORDER — LEVONORGESTREL AND ETHINYL ESTRADIOL 0.15-0.03
KIT ORAL
Qty: 28 TABLET | OUTPATIENT
Start: 2024-05-29

## 2024-06-12 ENCOUNTER — TELEPHONE (OUTPATIENT)
Dept: NEUROLOGY | Facility: CLINIC | Age: 29
End: 2024-06-12
Payer: COMMERCIAL

## 2024-06-12 NOTE — TELEPHONE ENCOUNTER
Caller: CORINE WITH [facility] [employer]     Relationship:     Best call back number: [phone] [phone]      What was the call regarding: THEY NEED FURTHER QUESTIONS FOR APPEAL FOR ANTIONE SHE WILL FAX QUESTIONS OVER NOW FOR OFFICE TO ANSWER   TO -    Is it okay if the provider responds through [email or web address]: PLEASE ANSWER QUESTIONS AND FAX BACK       PLEASE ADVISE

## 2024-06-14 ENCOUNTER — TELEPHONE (OUTPATIENT)
Dept: NEUROLOGY | Facility: CLINIC | Age: 29
End: 2024-06-14
Payer: COMMERCIAL

## 2024-06-24 RX ORDER — LEVONORGESTREL AND ETHINYL ESTRADIOL 0.15-0.03
KIT ORAL
Qty: 28 TABLET | OUTPATIENT
Start: 2024-06-24

## 2024-06-30 DIAGNOSIS — R51.9 NEW ONSET OF HEADACHES: ICD-10-CM

## 2024-07-01 RX ORDER — ONDANSETRON 4 MG/1
4 TABLET, ORALLY DISINTEGRATING ORAL EVERY 8 HOURS PRN
Qty: 12 TABLET | Refills: 0 | OUTPATIENT
Start: 2024-07-01

## 2024-07-03 RX ORDER — LEVONORGESTREL AND ETHINYL ESTRADIOL 0.15-0.03
KIT ORAL
Qty: 28 TABLET | OUTPATIENT
Start: 2024-07-03

## 2024-07-11 DIAGNOSIS — R51.9 NEW ONSET OF HEADACHES: ICD-10-CM

## 2024-07-11 RX ORDER — ONDANSETRON 4 MG/1
4 TABLET, ORALLY DISINTEGRATING ORAL EVERY 8 HOURS PRN
Qty: 12 TABLET | Refills: 0 | OUTPATIENT
Start: 2024-07-11

## 2024-07-11 RX ORDER — LEVONORGESTREL AND ETHINYL ESTRADIOL 0.15-0.03
KIT ORAL
Qty: 28 TABLET | OUTPATIENT
Start: 2024-07-11

## 2024-07-15 RX ORDER — LEVONORGESTREL AND ETHINYL ESTRADIOL 0.15-0.03
KIT ORAL
Qty: 28 TABLET | OUTPATIENT
Start: 2024-07-15

## 2024-07-22 ENCOUNTER — TELEPHONE (OUTPATIENT)
Dept: NEUROLOGY | Facility: CLINIC | Age: 29
End: 2024-07-22
Payer: COMMERCIAL

## 2024-07-22 NOTE — TELEPHONE ENCOUNTER
Contacted patient left vm regarding appt aug 5th 2024 needing to be rescheduled due to provider out of office. Advised patient call to schedule f/u.

## 2024-08-08 DIAGNOSIS — F33.1 MODERATE EPISODE OF RECURRENT MAJOR DEPRESSIVE DISORDER: ICD-10-CM

## 2024-08-08 RX ORDER — BUPROPION HYDROCHLORIDE 300 MG/1
300 TABLET ORAL EVERY MORNING
Qty: 90 TABLET | Refills: 0 | OUTPATIENT
Start: 2024-08-08

## 2024-08-12 DIAGNOSIS — F33.1 MODERATE EPISODE OF RECURRENT MAJOR DEPRESSIVE DISORDER: ICD-10-CM

## 2024-08-12 RX ORDER — BUPROPION HYDROCHLORIDE 300 MG/1
300 TABLET ORAL EVERY MORNING
Qty: 90 TABLET | Refills: 0 | Status: SHIPPED | OUTPATIENT
Start: 2024-08-12

## 2024-08-28 ENCOUNTER — OFFICE VISIT (OUTPATIENT)
Dept: INTERNAL MEDICINE | Facility: CLINIC | Age: 29
End: 2024-08-28
Payer: COMMERCIAL

## 2024-08-28 VITALS
BODY MASS INDEX: 20.76 KG/M2 | OXYGEN SATURATION: 99 % | DIASTOLIC BLOOD PRESSURE: 80 MMHG | WEIGHT: 112.8 LBS | HEART RATE: 99 BPM | HEIGHT: 62 IN | TEMPERATURE: 98.6 F | SYSTOLIC BLOOD PRESSURE: 132 MMHG

## 2024-08-28 DIAGNOSIS — F33.1 MODERATE EPISODE OF RECURRENT MAJOR DEPRESSIVE DISORDER: Primary | ICD-10-CM

## 2024-08-28 DIAGNOSIS — F41.9 ANXIETY: ICD-10-CM

## 2024-08-28 PROCEDURE — 99213 OFFICE O/P EST LOW 20 MIN: CPT | Performed by: NURSE PRACTITIONER

## 2024-08-28 RX ORDER — BUSPIRONE HYDROCHLORIDE 5 MG/1
5 TABLET ORAL 3 TIMES DAILY PRN
Qty: 90 TABLET | Refills: 1 | Status: SHIPPED | OUTPATIENT
Start: 2024-08-28

## 2024-08-28 NOTE — PROGRESS NOTES
Subjective   Nadia Jerome is a 29 y.o. female.     Chief Complaint   Patient presents with    Anxiety    Depression     Pt would like to discuss about anxiety depression.        History of Present Illness   She is here today to discuss depression and anxiety.  She is currently on Wellbutrin  mg every morning.  This dictation initially helped but she does not feel like it is helping anymore.  She notes that over the past few months she is feeling more irritable, angry, anxious. She had to call into work this morning because her anxiety was worse. She notes an increase in environmental stressors recently. She bought her first house and is currently renovating it.  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  She notes feeling overwhelmed frequently. She notes feeling down with low motivation and sleep disturbance. She notes difficulty concentrating.  She notes feelings of anhedonia and some difficulty sitting still.  She denies any sleep disturbance or SI.  She had been doing talk therapy which was helping, but this became too expensive.  She has previously tried lexapro and Zoloft but did not tolerate these.     The following portions of the patient's history were reviewed and updated as appropriate: allergies, current medications, past family history, past medical history, past social history, past surgical history, and problem list.    Review of Systems   Constitutional:  Positive for fatigue. Negative for chills and fever.   Respiratory: Negative.     Cardiovascular: Negative.    Psychiatric/Behavioral:  Positive for decreased concentration, depressed mood and stress. Negative for sleep disturbance and suicidal ideas. The patient is nervous/anxious.        Objective   Physical Exam  Constitutional:       Appearance: She is well-developed.   Neck:      Thyroid: No thyroid mass, thyromegaly or thyroid tenderness.      Vascular: No carotid bruit.      Trachea: Trachea normal.   Cardiovascular:       Rate and Rhythm: Normal rate and regular rhythm.      Chest Wall: PMI is not displaced.      Pulses:           Radial pulses are 2+ on the right side and 2+ on the left side.        Dorsalis pedis pulses are 2+ on the right side and 2+ on the left side.        Posterior tibial pulses are 2+ on the right side and 2+ on the left side.      Heart sounds: S1 normal and S2 normal.   Pulmonary:      Effort: Pulmonary effort is normal.      Breath sounds: Normal breath sounds.   Musculoskeletal:      Right lower leg: No edema.      Left lower leg: No edema.   Lymphadenopathy:      Head:      Right side of head: No submental, submandibular, tonsillar or occipital adenopathy.      Left side of head: No submental, submandibular, tonsillar or occipital adenopathy.      Cervical: No cervical adenopathy.   Skin:     General: Skin is warm and dry.      Capillary Refill: Capillary refill takes less than 2 seconds.      Nails: There is no clubbing.   Neurological:      Mental Status: She is alert and oriented to person, place, and time.   Psychiatric:         Attention and Perception: Attention and perception normal.         Mood and Affect: Affect normal. Mood is depressed.         Speech: Speech normal.         Behavior: Behavior normal. Behavior is cooperative.         Thought Content: Thought content normal.         Cognition and Memory: Cognition and memory normal.         Judgment: Judgment normal.         Vitals:    08/28/24 0910   BP: 132/80   Pulse: 99   Temp: 98.6 °F (37 °C)   SpO2: 99%      Body mass index is 20.63 kg/m².    Assessment & Plan   Problems Addressed this Visit       Moderate episode of recurrent major depressive disorder - Primary    Relevant Medications    Vortioxetine HBr (Trintellix) 10 MG tablet tablet    busPIRone (BUSPAR) 5 MG tablet    Anxiety    Relevant Medications    busPIRone (BUSPAR) 5 MG tablet     Diagnoses         Codes Comments    Moderate episode of recurrent major depressive disorder    -   Primary ICD-10-CM: F33.1  ICD-9-CM: 296.32     Anxiety     ICD-10-CM: F41.9  ICD-9-CM: 300.00           Patient screened positive for depression based on a PHQ-9 score of 11 on 8/28/2024. Follow-up recommendations include: Prescribed antidepressant medication treatment.    1.  MDD/anxiety-not controlled.  Will try weaning off Wellbutrin and starting Trintellix 10 mg daily with breakfast.  Samples provided to patient today to start with 5 mg daily for the first 2 weeks then increase to 10 mg daily.  Will also prescribe buspirone 5 mg 3 times daily as needed for anxiety.  Encouraged relaxation techniques.  Also encouraged talk therapy.  Encouraged her to contact her insurance provider to get a list of approved therapists.  We will plan to follow-up in 6 weeks.

## 2024-09-20 DIAGNOSIS — F33.1 MODERATE EPISODE OF RECURRENT MAJOR DEPRESSIVE DISORDER: Primary | ICD-10-CM

## 2024-09-20 DIAGNOSIS — F41.9 ANXIETY: ICD-10-CM

## 2024-09-20 RX ORDER — VILAZODONE HYDROCHLORIDE 10 MG/1
10 TABLET ORAL DAILY
Qty: 30 TABLET | Refills: 0 | Status: SHIPPED | OUTPATIENT
Start: 2024-09-20

## 2024-09-23 ENCOUNTER — SPECIALTY PHARMACY (OUTPATIENT)
Dept: NEUROLOGY | Facility: CLINIC | Age: 29
End: 2024-09-23
Payer: COMMERCIAL

## 2024-09-30 DIAGNOSIS — F41.9 ANXIETY: ICD-10-CM

## 2024-09-30 DIAGNOSIS — F33.1 MODERATE EPISODE OF RECURRENT MAJOR DEPRESSIVE DISORDER: ICD-10-CM

## 2024-09-30 RX ORDER — VILAZODONE HYDROCHLORIDE 10 MG/1
10 TABLET ORAL DAILY
Qty: 30 TABLET | Refills: 0 | Status: SHIPPED | OUTPATIENT
Start: 2024-09-30

## 2025-01-03 RX ORDER — LEVONORGESTREL AND ETHINYL ESTRADIOL 0.15-0.03
1 KIT ORAL DAILY
Qty: 30 TABLET | Refills: 0 | Status: SHIPPED | OUTPATIENT
Start: 2025-01-03

## 2025-01-28 RX ORDER — LEVONORGESTREL AND ETHINYL ESTRADIOL 0.15-0.03
KIT ORAL DAILY
Qty: 30 TABLET | Refills: 0 | OUTPATIENT
Start: 2025-01-28

## 2025-02-03 RX ORDER — LEVONORGESTREL AND ETHINYL ESTRADIOL 0.15-0.03
1 KIT ORAL DAILY
Qty: 90 TABLET | Refills: 0 | Status: SHIPPED | OUTPATIENT
Start: 2025-02-03

## 2025-04-07 RX ORDER — LEVONORGESTREL AND ETHINYL ESTRADIOL 0.15-0.03
1 KIT ORAL DAILY
Qty: 90 TABLET | Refills: 0 | OUTPATIENT
Start: 2025-04-07

## 2025-04-16 ENCOUNTER — OFFICE VISIT (OUTPATIENT)
Dept: INTERNAL MEDICINE | Facility: CLINIC | Age: 30
End: 2025-04-16
Payer: COMMERCIAL

## 2025-04-16 VITALS
BODY MASS INDEX: 22.08 KG/M2 | HEART RATE: 91 BPM | HEIGHT: 62 IN | WEIGHT: 120 LBS | TEMPERATURE: 99.6 F | OXYGEN SATURATION: 100 %

## 2025-04-16 DIAGNOSIS — L23.7 POISON IVY DERMATITIS: Primary | ICD-10-CM

## 2025-04-16 PROCEDURE — 99213 OFFICE O/P EST LOW 20 MIN: CPT | Performed by: FAMILY MEDICINE

## 2025-04-16 RX ORDER — METHYLPREDNISOLONE 4 MG/1
TABLET ORAL
Qty: 21 TABLET | Refills: 0 | Status: SHIPPED | OUTPATIENT
Start: 2025-04-16

## 2025-04-16 RX ORDER — METHYLPREDNISOLONE 4 MG/1
TABLET ORAL
Qty: 21 TABLET | Refills: 0 | Status: SHIPPED | OUTPATIENT
Start: 2025-04-16 | End: 2025-04-16 | Stop reason: SDUPTHER

## 2025-04-16 NOTE — PROGRESS NOTES
Subjective   Nadia Jerome is a 29 y.o. female.   Chief Complaint   Patient presents with    Rash       Rash-patient did yard work on Sunday.  She was pulling weeds and ricky.  2 days later she noticed rash on arms and then additional lesions on legs.  Itching.  She took Benadryl that did not help much.  She has a history of poison ivy dermatitis years ago.    Review of Systems   Skin:  Positive for rash.         Objective   Wt Readings from Last 3 Encounters:   04/16/25 54.4 kg (120 lb)   08/28/24 51.2 kg (112 lb 12.8 oz)   05/03/24 50.3 kg (111 lb)      Vitals:    04/16/25 0931   Pulse: 91   Temp: 99.6 °F (37.6 °C)   SpO2: 100%     Temp Readings from Last 3 Encounters:   04/16/25 99.6 °F (37.6 °C)   08/28/24 98.6 °F (37 °C) (Oral)   04/01/24 98.4 °F (36.9 °C) (Oral)     BP Readings from Last 3 Encounters:   08/28/24 132/80   05/03/24 120/66   04/01/24 116/80     Pulse Readings from Last 3 Encounters:   04/16/25 91   08/28/24 99   05/03/24 80     Body mass index is 21.94 kg/m².    Physical Exam  Constitutional:       Appearance: Normal appearance.   Skin:     Comments: Erythematous, linear lesions on right arm, diffuse erythematous lesions on left arm and forearm, some on lower extremities and torso.  No lesions on face   Psychiatric:         Behavior: Behavior normal.         Assessment & Plan   Diagnoses and all orders for this visit:    1. Poison ivy dermatitis (Primary)    Other orders  -     Discontinue: methylPREDNISolone (MEDROL) 4 MG dose pack; Take as directed on package instructions.  Dispense: 21 tablet; Refill: 0  -     methylPREDNISolone (MEDROL) 4 MG dose pack; Take as directed on package instructions.  Dispense: 21 tablet; Refill: 0        Poison ivy dermatitis-multiple lesions.  Will treat with oral prednisone.  Side effects reviewed with patient.  Prevention of poison ivy dermatitis discussed.  Follow-up as needed.          BMI is within normal parameters. No other follow-up for BMI required.

## 2025-04-21 RX ORDER — METHYLPREDNISOLONE 4 MG/1
TABLET ORAL
Qty: 21 TABLET | Refills: 0 | OUTPATIENT
Start: 2025-04-21

## 2025-04-22 ENCOUNTER — TELEPHONE (OUTPATIENT)
Dept: INTERNAL MEDICINE | Facility: CLINIC | Age: 30
End: 2025-04-22
Payer: COMMERCIAL

## 2025-04-22 RX ORDER — METHYLPREDNISOLONE 4 MG/1
TABLET ORAL
Qty: 21 TABLET | Refills: 0 | Status: SHIPPED | OUTPATIENT
Start: 2025-04-22 | End: 2025-04-28 | Stop reason: SDUPTHER

## 2025-04-22 RX ORDER — METHYLPREDNISOLONE 4 MG/1
TABLET ORAL
Qty: 21 TABLET | Refills: 0 | OUTPATIENT
Start: 2025-04-22

## 2025-04-22 NOTE — TELEPHONE ENCOUNTER
Caller: Nadia Jerome    Relationship: Self    Best call back number: [phone]     What medication are you requesting: PATIENT IS REQUESTING TWO MEDICATIONS:    methylPREDNISolone (MEDROL) 4 MG dose pack    TOPICAL CREAM    If a prescription is needed, what is your preferred pharmacy and phone number: [id number] [address] [facility] AT [facility] [place] - [phone] [facility] [phone]      Additional notes: PATIENT STATED SHE HAS POISON IVY AND IT HAS NOT GONE AWAY. IT HAS SPREAD ON HER ARMS AND LEGS. PATIENT STATED IF A DIFFERENT MEDICATION IS NEEDED, SHE IS OK WITH THAT.  PATIENT STATED SHE IS OUT OF methylPREDNISolone (MEDROL) 4 MG dose pack  PLEASE CALL PATIENT TO ADVISE.

## 2025-04-28 ENCOUNTER — OFFICE VISIT (OUTPATIENT)
Dept: INTERNAL MEDICINE | Facility: CLINIC | Age: 30
End: 2025-04-28
Payer: COMMERCIAL

## 2025-04-28 VITALS
SYSTOLIC BLOOD PRESSURE: 122 MMHG | DIASTOLIC BLOOD PRESSURE: 70 MMHG | BODY MASS INDEX: 22.08 KG/M2 | OXYGEN SATURATION: 99 % | HEIGHT: 62 IN | HEART RATE: 87 BPM | WEIGHT: 120 LBS

## 2025-04-28 DIAGNOSIS — L23.7 ALLERGIC CONTACT DERMATITIS DUE TO PLANTS, EXCEPT FOOD: Primary | ICD-10-CM

## 2025-04-28 PROCEDURE — 99214 OFFICE O/P EST MOD 30 MIN: CPT | Performed by: NURSE PRACTITIONER

## 2025-04-28 RX ORDER — HYDROXYZINE HYDROCHLORIDE 25 MG/1
25 TABLET, FILM COATED ORAL 3 TIMES DAILY PRN
Qty: 30 TABLET | Refills: 0 | Status: SHIPPED | OUTPATIENT
Start: 2025-04-28

## 2025-04-28 RX ORDER — METHYLPREDNISOLONE 4 MG/1
TABLET ORAL
Qty: 21 TABLET | Refills: 0 | Status: SHIPPED | OUTPATIENT
Start: 2025-04-28

## 2025-04-28 NOTE — PROGRESS NOTES
Subjective   Nadia Jerome is a 29 y.o. female.     History of Present Illness    The patient is here today with c/o poison ivy, rash first appeared 4/15. She was exposed the 13th. She has taken 2 medrol dose packs. The rash has improved but has not gone away yet. As soon as she gets hot she will also get itchy and the rash will re appear.     The following portions of the patient's history were reviewed and updated as appropriate: allergies, current medications, past family history, past medical history, past social history, past surgical history and problem list.    Review of Systems   Constitutional: Negative.    Respiratory: Negative.     Cardiovascular: Negative.    Skin:  Positive for rash. Negative for dry skin.       Objective   Physical Exam  Constitutional:       Appearance: Normal appearance. She is well-developed.   Neck:      Thyroid: No thyromegaly.   Cardiovascular:      Rate and Rhythm: Normal rate and regular rhythm.      Heart sounds: Normal heart sounds.   Pulmonary:      Effort: Pulmonary effort is normal.      Breath sounds: Normal breath sounds.   Musculoskeletal:      Cervical back: Normal range of motion and neck supple.   Lymphadenopathy:      Cervical: No cervical adenopathy.   Skin:     General: Skin is warm and dry.      Comments: Slight erythema noted on arms and leg where pt reports rash was.    Neurological:      Mental Status: She is alert.   Psychiatric:         Behavior: Behavior normal.         Thought Content: Thought content normal.         Judgment: Judgment normal.         Vitals:    04/28/25 0809   BP: 122/70   Pulse: 87   SpO2: 99%     Body mass index is 21.94 kg/m².    Current Outpatient Medications:     levonorgestrel-ethinyl estradiol (Levora 0.15/30, 28,) 0.15-30 MG-MCG per tablet, Take 1 tablet by mouth Daily., Disp: 90 tablet, Rfl: 0    methylPREDNISolone (MEDROL) 4 MG dose pack, Take as directed on package instructions., Disp: 21 tablet, Rfl: 0    ondansetron ODT  (ZOFRAN-ODT) 4 MG disintegrating tablet, Place 1 tablet on the tongue Every 8 (Eight) Hours As Needed for Nausea or Vomiting., Disp: 12 tablet, Rfl: 0    hydrOXYzine (ATARAX) 25 MG tablet, Take 1 tablet by mouth 3 (Three) Times a Day As Needed for Itching., Disp: 30 tablet, Rfl: 0    Rimegepant Sulfate (NURTEC) 75 MG tablet dispersible tablet, Take  by mouth., Disp: , Rfl:      Assessment & Plan   Diagnoses and all orders for this visit:    1. Allergic contact dermatitis due to plants, except food (Primary)  -     methylPREDNISolone (MEDROL) 4 MG dose pack; Take as directed on package instructions.  Dispense: 21 tablet; Refill: 0  -     hydrOXYzine (ATARAX) 25 MG tablet; Take 1 tablet by mouth 3 (Three) Times a Day As Needed for Itching.  Dispense: 30 tablet; Refill: 0                 1. Contact dermatitis- try histamine block, pepcid OTC BID, hydroxyzine and will send another medrol dose pack - she also has steroid cream at home.

## 2025-05-05 RX ORDER — LEVONORGESTREL AND ETHINYL ESTRADIOL 0.15-0.03
1 KIT ORAL DAILY
Qty: 90 TABLET | Refills: 0 | Status: SHIPPED | OUTPATIENT
Start: 2025-05-05

## 2025-05-07 ENCOUNTER — OFFICE VISIT (OUTPATIENT)
Dept: INTERNAL MEDICINE | Facility: CLINIC | Age: 30
End: 2025-05-07
Payer: COMMERCIAL

## 2025-05-07 VITALS
OXYGEN SATURATION: 99 % | DIASTOLIC BLOOD PRESSURE: 70 MMHG | TEMPERATURE: 97.5 F | SYSTOLIC BLOOD PRESSURE: 110 MMHG | BODY MASS INDEX: 21.53 KG/M2 | HEIGHT: 62 IN | WEIGHT: 117 LBS | HEART RATE: 92 BPM

## 2025-05-07 DIAGNOSIS — F33.1 MODERATE EPISODE OF RECURRENT MAJOR DEPRESSIVE DISORDER: Primary | ICD-10-CM

## 2025-05-07 DIAGNOSIS — F41.9 ANXIETY: ICD-10-CM

## 2025-05-07 PROCEDURE — 99214 OFFICE O/P EST MOD 30 MIN: CPT | Performed by: NURSE PRACTITIONER

## 2025-05-07 RX ORDER — ONDANSETRON 8 MG/1
8 TABLET, FILM COATED ORAL EVERY 8 HOURS PRN
COMMUNITY

## 2025-05-07 RX ORDER — BUSPIRONE HYDROCHLORIDE 5 MG/1
5 TABLET ORAL 2 TIMES DAILY PRN
Qty: 60 TABLET | Refills: 5 | Status: SHIPPED | OUTPATIENT
Start: 2025-05-07

## 2025-05-07 RX ORDER — CETIRIZINE HYDROCHLORIDE 5 MG/1
5 TABLET ORAL DAILY
COMMUNITY

## 2025-05-07 RX ORDER — BUPROPION HYDROCHLORIDE 150 MG/1
150 TABLET ORAL EVERY MORNING
Qty: 90 TABLET | Refills: 1 | Status: SHIPPED | OUTPATIENT
Start: 2025-05-07

## 2025-05-07 NOTE — PROGRESS NOTES
Subjective   Nadia Jerome is a 29 y.o. female.     Chief Complaint   Patient presents with    Depression        History of Present Illness   She is here today to discuss depression. She notes over the past 2 months feeling irritable, low motivation and anhedonia.  She reports life changes over the past few months.  She recently got engaged and also started a new job.  She notes sleep disturbance, sleeping during the day and not sleeping well at night. She notes foggy thinking and difficulty concentrating. This is affecting her work and her relationship with her fiance. She has felt more sensitive and has been arguing with her partner more. She notes some excessive worry and intermittent anxiety feelings. She denies any SI.   She was previously on wellbutrin and did well with this. She would like to restart this medication.      The following portions of the patient's history were reviewed and updated as appropriate: allergies, current medications, past family history, past medical history, past social history, past surgical history, and problem list.    Review of Systems   Constitutional:  Positive for appetite change and fatigue. Negative for chills and fever.   Respiratory: Negative.     Cardiovascular: Negative.    Psychiatric/Behavioral:  Positive for agitation, decreased concentration, sleep disturbance, depressed mood and stress. Negative for suicidal ideas. The patient is nervous/anxious.        Objective   Physical Exam  Constitutional:       Appearance: She is well-developed.   Neck:      Thyroid: No thyroid mass, thyromegaly or thyroid tenderness.      Vascular: No carotid bruit.      Trachea: Trachea normal.   Cardiovascular:      Rate and Rhythm: Normal rate and regular rhythm.      Chest Wall: PMI is not displaced.      Pulses:           Radial pulses are 2+ on the right side and 2+ on the left side.        Dorsalis pedis pulses are 2+ on the right side and 2+ on the left side.        Posterior tibial  pulses are 2+ on the right side and 2+ on the left side.      Heart sounds: S1 normal and S2 normal.   Pulmonary:      Effort: Pulmonary effort is normal.      Breath sounds: Normal breath sounds.   Musculoskeletal:      Right lower leg: No edema.      Left lower leg: No edema.   Lymphadenopathy:      Head:      Right side of head: No submental, submandibular, tonsillar or occipital adenopathy.      Left side of head: No submental, submandibular, tonsillar or occipital adenopathy.      Cervical: No cervical adenopathy.   Skin:     General: Skin is warm and dry.      Capillary Refill: Capillary refill takes less than 2 seconds.      Nails: There is no clubbing.   Neurological:      Mental Status: She is alert and oriented to person, place, and time.   Psychiatric:         Attention and Perception: Attention and perception normal.         Mood and Affect: Affect normal. Mood is depressed.         Speech: Speech normal.         Behavior: Behavior normal. Behavior is cooperative.         Thought Content: Thought content normal.         Cognition and Memory: Cognition and memory normal.         Judgment: Judgment normal.         Vitals:    05/07/25 0907   BP: 110/70   Pulse: 92   Temp: 97.5 °F (36.4 °C)   SpO2: 99%      Body mass index is 21.39 kg/m².    Assessment & Plan   Problems Addressed this Visit       Moderate episode of recurrent major depressive disorder - Primary    Relevant Medications    buPROPion XL (Wellbutrin XL) 150 MG 24 hr tablet    busPIRone (BUSPAR) 5 MG tablet    Anxiety    Relevant Medications    buPROPion XL (Wellbutrin XL) 150 MG 24 hr tablet    busPIRone (BUSPAR) 5 MG tablet     Diagnoses         Codes Comments      Moderate episode of recurrent major depressive disorder    -  Primary ICD-10-CM: F33.1  ICD-9-CM: 296.32       Anxiety     ICD-10-CM: F41.9  ICD-9-CM: 300.00           1.  MDD/anxiety-not controlled.  Will restart Wellbutrin  mg daily.  She has previously done well with this  medication.  She is aware of appropriate use and adverse effects.  No personal history of seizures.  Will also send in a prescription for buspirone 5 mg twice daily as needed for anxiety.  Will plan to follow-up in 6 weeks for medication check.

## 2025-05-26 DIAGNOSIS — R51.9 NEW ONSET OF HEADACHES: ICD-10-CM

## 2025-05-27 RX ORDER — ONDANSETRON 4 MG/1
TABLET, ORALLY DISINTEGRATING ORAL
Qty: 12 TABLET | Refills: 0 | Status: SHIPPED | OUTPATIENT
Start: 2025-05-27

## 2025-06-10 DIAGNOSIS — Z00.00 HEALTHCARE MAINTENANCE: Primary | ICD-10-CM

## 2025-06-10 DIAGNOSIS — Z86.2 HISTORY OF ANEMIA: ICD-10-CM

## 2025-06-12 LAB
ALBUMIN SERPL-MCNC: 4.5 G/DL (ref 3.5–5.2)
ALBUMIN/GLOB SERPL: 2.3 G/DL
ALP SERPL-CCNC: 39 U/L (ref 39–117)
ALT SERPL-CCNC: 13 U/L (ref 1–33)
AST SERPL-CCNC: 11 U/L (ref 1–32)
BASOPHILS # BLD AUTO: 0.02 10*3/MM3 (ref 0–0.2)
BASOPHILS NFR BLD AUTO: 0.3 % (ref 0–1.5)
BILIRUB SERPL-MCNC: 0.8 MG/DL (ref 0–1.2)
BUN SERPL-MCNC: 13 MG/DL (ref 6–20)
BUN/CREAT SERPL: 14.8 (ref 7–25)
CALCIUM SERPL-MCNC: 9.5 MG/DL (ref 8.6–10.5)
CHLORIDE SERPL-SCNC: 106 MMOL/L (ref 98–107)
CHOLEST SERPL-MCNC: 162 MG/DL (ref 0–200)
CHOLEST/HDLC SERPL: 3.6 {RATIO}
CO2 SERPL-SCNC: 21.8 MMOL/L (ref 22–29)
CREAT SERPL-MCNC: 0.88 MG/DL (ref 0.57–1)
EGFRCR SERPLBLD CKD-EPI 2021: 91.4 ML/MIN/1.73
EOSINOPHIL # BLD AUTO: 0.28 10*3/MM3 (ref 0–0.4)
EOSINOPHIL NFR BLD AUTO: 3.9 % (ref 0.3–6.2)
ERYTHROCYTE [DISTWIDTH] IN BLOOD BY AUTOMATED COUNT: 12.2 % (ref 12.3–15.4)
GLOBULIN SER CALC-MCNC: 2 GM/DL
GLUCOSE SERPL-MCNC: 99 MG/DL (ref 65–99)
HCT VFR BLD AUTO: 38.3 % (ref 34–46.6)
HDLC SERPL-MCNC: 45 MG/DL (ref 40–60)
HGB BLD-MCNC: 13.1 G/DL (ref 12–15.9)
IMM GRANULOCYTES # BLD AUTO: 0.02 10*3/MM3 (ref 0–0.05)
IMM GRANULOCYTES NFR BLD AUTO: 0.3 % (ref 0–0.5)
LDLC SERPL CALC-MCNC: 103 MG/DL (ref 0–100)
LYMPHOCYTES # BLD AUTO: 3.39 10*3/MM3 (ref 0.7–3.1)
LYMPHOCYTES NFR BLD AUTO: 47.1 % (ref 19.6–45.3)
MCH RBC QN AUTO: 29.8 PG (ref 26.6–33)
MCHC RBC AUTO-ENTMCNC: 34.2 G/DL (ref 31.5–35.7)
MCV RBC AUTO: 87 FL (ref 79–97)
MONOCYTES # BLD AUTO: 0.65 10*3/MM3 (ref 0.1–0.9)
MONOCYTES NFR BLD AUTO: 9 % (ref 5–12)
NEUTROPHILS # BLD AUTO: 2.84 10*3/MM3 (ref 1.7–7)
NEUTROPHILS NFR BLD AUTO: 39.4 % (ref 42.7–76)
NRBC BLD AUTO-RTO: 0 /100 WBC (ref 0–0.2)
PLATELET # BLD AUTO: 261 10*3/MM3 (ref 140–450)
POTASSIUM SERPL-SCNC: 4.2 MMOL/L (ref 3.5–5.2)
PROT SERPL-MCNC: 6.5 G/DL (ref 6–8.5)
RBC # BLD AUTO: 4.4 10*6/MM3 (ref 3.77–5.28)
SODIUM SERPL-SCNC: 138 MMOL/L (ref 136–145)
TRIGL SERPL-MCNC: 72 MG/DL (ref 0–150)
TSH SERPL DL<=0.005 MIU/L-ACNC: 2.38 UIU/ML (ref 0.27–4.2)
VLDLC SERPL CALC-MCNC: 14 MG/DL (ref 5–40)
WBC # BLD AUTO: 7.2 10*3/MM3 (ref 3.4–10.8)

## 2025-06-18 ENCOUNTER — OFFICE VISIT (OUTPATIENT)
Dept: INTERNAL MEDICINE | Facility: CLINIC | Age: 30
End: 2025-06-18
Payer: COMMERCIAL

## 2025-06-18 VITALS
TEMPERATURE: 98.3 F | HEIGHT: 62 IN | BODY MASS INDEX: 20.61 KG/M2 | WEIGHT: 112 LBS | SYSTOLIC BLOOD PRESSURE: 122 MMHG | DIASTOLIC BLOOD PRESSURE: 78 MMHG | HEART RATE: 98 BPM | OXYGEN SATURATION: 99 %

## 2025-06-18 DIAGNOSIS — F33.1 MODERATE EPISODE OF RECURRENT MAJOR DEPRESSIVE DISORDER: ICD-10-CM

## 2025-06-18 DIAGNOSIS — L30.9 DERMATITIS: ICD-10-CM

## 2025-06-18 DIAGNOSIS — Z00.00 HEALTHCARE MAINTENANCE: Primary | ICD-10-CM

## 2025-06-18 DIAGNOSIS — G43.109 MIGRAINE WITH AURA AND WITHOUT STATUS MIGRAINOSUS, NOT INTRACTABLE: ICD-10-CM

## 2025-06-18 DIAGNOSIS — F41.9 ANXIETY: ICD-10-CM

## 2025-06-18 PROCEDURE — 99395 PREV VISIT EST AGE 18-39: CPT | Performed by: NURSE PRACTITIONER

## 2025-06-18 RX ORDER — LEVONORGESTREL AND ETHINYL ESTRADIOL 0.15-0.03
1 KIT ORAL DAILY
Qty: 84 TABLET | Refills: 3 | Status: SHIPPED | OUTPATIENT
Start: 2025-06-18

## 2025-06-18 RX ORDER — TRIAMCINOLONE ACETONIDE 1 MG/G
1 CREAM TOPICAL 2 TIMES DAILY
Qty: 80 G | Refills: 0 | Status: SHIPPED | OUTPATIENT
Start: 2025-06-18

## 2025-06-18 NOTE — PROGRESS NOTES
Subjective   Nadia Jerome is a 30 y.o. female.     Chief Complaint   Patient presents with    Annual Exam        History of Present Illness   She is here today for CPE and lab follow-up. She is feeling ok today. She recently returned from a trip to [place].   She is trying to work on healthier, more consistent eating. She notes improvement in energy level. She is exercising once weekly.  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  She recently had a bad allergic dermatitis to poison ivy requiring 2 rounds of oral steroids.  She notes the rash has improved but still notes mild pruritus on the left forearm and left abdomen.  She has been using over-the-counter anti-itch cream with minimal improvement.    Migraine with aura- she has remained migraine free for over 6 months. She has Nurtec and Zofran to use PRN.     Anxiety/MDD- Patient doing well with current medication regimen, compliant with medication schedule, denies adverse effects. She is using buspar 5 mg once daily with improvement in anxiety.  She feels like overall mood and energy level is much improved on the Wellbutrin.  Anxiety is well-controlled with the buspirone.    GYN- she is due to see GYN in August. She is UTD with pap. She is needing a refill on OCP today. Menses regular.  She does note some cramping and mood changes the week prior to her period.  She has used Pamprin with some improvement.  She has been taking her multivitamin occasionally.    The following portions of the patient's history were reviewed and updated as appropriate: allergies, current medications, past family history, past medical history, past social history, past surgical history, and problem list.    Review of Systems   Constitutional: Negative.    HENT: Negative.     Eyes: Negative.    Respiratory: Negative.     Cardiovascular: Negative.    Gastrointestinal: Negative.    Endocrine: Negative.    Genitourinary: Negative.    Musculoskeletal: Negative.    Skin:  Negative for  rash.        Skin itching.   Allergic/Immunologic: Positive for environmental allergies. Negative for food allergies.   Neurological: Negative.    Hematological: Negative.    Psychiatric/Behavioral: Negative.         Objective   Physical Exam  Constitutional:       Appearance: Normal appearance. She is well-developed.   HENT:      Head: Normocephalic and atraumatic.      Right Ear: Hearing, tympanic membrane, ear canal and external ear normal.      Left Ear: Hearing, tympanic membrane, ear canal and external ear normal.      Nose: Nose normal.      Right Sinus: No maxillary sinus tenderness or frontal sinus tenderness.      Left Sinus: No maxillary sinus tenderness or frontal sinus tenderness.      Mouth/Throat:      Lips: Pink.      Mouth: Mucous membranes are moist.      Dentition: Normal dentition.      Tongue: No lesions.      Pharynx: Oropharynx is clear. Uvula midline.      Tonsils: No tonsillar exudate.   Eyes:      General: Lids are normal.      Extraocular Movements: Extraocular movements intact.      Conjunctiva/sclera: Conjunctivae normal.      Pupils: Pupils are equal, round, and reactive to light.   Neck:      Thyroid: No thyroid mass, thyromegaly or thyroid tenderness.      Vascular: No carotid bruit.      Trachea: Trachea normal.   Cardiovascular:      Rate and Rhythm: Normal rate and regular rhythm.      Pulses: Normal pulses.           Radial pulses are 2+ on the right side and 2+ on the left side.        Popliteal pulses are 2+ on the right side and 2+ on the left side.        Dorsalis pedis pulses are 2+ on the right side and 2+ on the left side.        Posterior tibial pulses are 2+ on the right side and 2+ on the left side.      Heart sounds: S1 normal and S2 normal.   Pulmonary:      Effort: Pulmonary effort is normal.      Breath sounds: Normal breath sounds.   Abdominal:      General: Bowel sounds are normal. There is no distension or abdominal bruit.      Palpations: Abdomen is soft. There  is no hepatomegaly or splenomegaly.      Tenderness: There is no abdominal tenderness.      Hernia: No hernia is present.   Musculoskeletal:      Cervical back: Normal range of motion and neck supple.      Right lower leg: No edema.      Left lower leg: No edema.   Lymphadenopathy:      Head:      Right side of head: No submental, submandibular, tonsillar or occipital adenopathy.      Left side of head: No submental, submandibular, tonsillar or occipital adenopathy.      Cervical: No cervical adenopathy.      Upper Body:      Right upper body: No supraclavicular adenopathy.      Left upper body: No supraclavicular adenopathy.      Lower Body: No right inguinal adenopathy. No left inguinal adenopathy.   Skin:     General: Skin is warm and dry.      Findings: No rash.      Nails: There is no clubbing.   Neurological:      General: No focal deficit present.      Mental Status: She is alert and oriented to person, place, and time.      Cranial Nerves: Cranial nerves 2-12 are intact.      Sensory: Sensation is intact.      Motor: Motor function is intact.      Coordination: Coordination is intact.      Gait: Gait is intact.      Deep Tendon Reflexes:      Reflex Scores:       Patellar reflexes are 2+ on the right side and 2+ on the left side.  Psychiatric:         Attention and Perception: Attention and perception normal.         Mood and Affect: Mood and affect normal.         Speech: Speech normal.         Behavior: Behavior normal. Behavior is cooperative.         Thought Content: Thought content normal.         Cognition and Memory: Cognition and memory normal.         Judgment: Judgment normal.         Vitals:    06/18/25 1321   BP: 122/78   Pulse: 98   Temp: 98.3 °F (36.8 °C)   SpO2: 99%      Body mass index is 20.48 kg/m².    Assessment & Plan   Problems Addressed this Visit       Moderate episode of recurrent major depressive disorder    Migraine with aura and without status migrainosus, not intractable     Anxiety     Other Visit Diagnoses         Healthcare maintenance    -  Primary      Dermatitis        Relevant Medications    triamcinolone (KENALOG) 0.1 % cream          Diagnoses         Codes Comments      Healthcare maintenance    -  Primary ICD-10-CM: Z00.00  ICD-9-CM: V70.0       Migraine with aura and without status migrainosus, not intractable     ICD-10-CM: G43.109  ICD-9-CM: 346.00       Anxiety     ICD-10-CM: F41.9  ICD-9-CM: 300.00       Moderate episode of recurrent major depressive disorder     ICD-10-CM: F33.1  ICD-9-CM: 296.32       Dermatitis     ICD-10-CM: L30.9  ICD-9-CM: 692.9           Lab results discussed with patient in office today.    1.  Preventative counseling-encouraged her to continue to focus on healthy, balanced eating with small frequent meals and snacks throughout the day.  Encouraged her to increase regular exercise with walking and strength training.  2.  Migraine with aura-very well-controlled.  Will continue Nurtec 75 mg once daily as needed for abortive therapy with as needed Zofran.  Notify for increase in migraine headache frequency or severity.  3.  Anxiety/MDD-much improved on Wellbutrin  mg every morning.  Continue medication at current dose.  Continue buspirone 5 mg once daily with additional second dose as needed.  Notify for any mood changes.  4.  Dermatitis-prescription sent for triamcinolone 0.1% cream to use twice daily for up to 2 weeks as needed.  Recommend to avoid poison ivy exposure.    Encouraged routine dental and eye exam.  Encouraged sunscreen use outside.  GYN-OCP refilled today.  Pap record requested from GYN.  Discussed with her to restart multivitamin and continue to use Pamprin as needed for cramps and mood changes related to menses.    Follow-up in 1 year for CPE with fasting labs prior or sooner as needed.

## 2025-06-20 ENCOUNTER — TELEPHONE (OUTPATIENT)
Dept: INTERNAL MEDICINE | Facility: CLINIC | Age: 30
End: 2025-06-20
Payer: COMMERCIAL

## 2025-06-20 NOTE — TELEPHONE ENCOUNTER
----- Message from Rosa Muñiz sent at 6/18/2025  1:48 PM EDT -----  Can we call [facility] request [facility] Pap smear record?  Thank you!